# Patient Record
Sex: MALE | Race: WHITE | NOT HISPANIC OR LATINO | Employment: OTHER | URBAN - NONMETROPOLITAN AREA
[De-identification: names, ages, dates, MRNs, and addresses within clinical notes are randomized per-mention and may not be internally consistent; named-entity substitution may affect disease eponyms.]

---

## 2017-01-18 ENCOUNTER — HOSPITAL ENCOUNTER (OUTPATIENT)
Dept: GENERAL RADIOLOGY | Facility: HOSPITAL | Age: 27
Discharge: HOME | End: 2017-01-18
Attending: NURSE PRACTITIONER

## 2017-04-15 ENCOUNTER — APPOINTMENT (OUTPATIENT)
Dept: GENERAL RADIOLOGY | Facility: CLINIC | Age: 27
End: 2017-04-15

## 2017-04-15 PROCEDURE — 74022 RADEX COMPL AQT ABD SERIES: CPT | Performed by: PHYSICIAN ASSISTANT

## 2018-09-08 ENCOUNTER — TELEPHONE (OUTPATIENT)
Dept: URGENT CARE | Facility: CLINIC | Age: 28
End: 2018-09-08

## 2018-09-08 DIAGNOSIS — N45.1 ACUTE EPIDIDYMITIS: ICD-10-CM

## 2018-09-08 DIAGNOSIS — N50.819 PERSISTENT TESTICULAR PAIN: ICD-10-CM

## 2018-09-08 DIAGNOSIS — R30.0 DYSURIA: ICD-10-CM

## 2018-09-08 RX ORDER — SULFAMETHOXAZOLE AND TRIMETHOPRIM 800; 160 MG/1; MG/1
TABLET ORAL
Qty: 28 TABLET | Refills: 0 | Status: SHIPPED | OUTPATIENT
Start: 2018-09-08 | End: 2020-07-15

## 2018-09-10 ENCOUNTER — TELEPHONE (OUTPATIENT)
Dept: URGENT CARE | Facility: CLINIC | Age: 28
End: 2018-09-10

## 2018-09-25 ENCOUNTER — TRANSCRIBE ORDERS (OUTPATIENT)
Dept: ADMINISTRATIVE | Facility: HOSPITAL | Age: 28
End: 2018-09-25

## 2018-09-25 DIAGNOSIS — N50.811 TESTICULAR PAIN, RIGHT: Primary | ICD-10-CM

## 2018-09-27 ENCOUNTER — HOSPITAL ENCOUNTER (OUTPATIENT)
Dept: ULTRASOUND IMAGING | Facility: HOSPITAL | Age: 28
Discharge: HOME | End: 2018-09-27
Admitting: INTERNAL MEDICINE

## 2018-09-27 DIAGNOSIS — N50.811 TESTICULAR PAIN, RIGHT: ICD-10-CM

## 2018-09-27 PROCEDURE — 76870 US EXAM SCROTUM: CPT

## 2020-07-15 ENCOUNTER — OFFICE VISIT (OUTPATIENT)
Dept: NEUROLOGY | Facility: CLINIC | Age: 30
End: 2020-07-15

## 2020-07-15 VITALS
TEMPERATURE: 97.1 F | HEIGHT: 68 IN | BODY MASS INDEX: 30.31 KG/M2 | DIASTOLIC BLOOD PRESSURE: 70 MMHG | HEART RATE: 112 BPM | SYSTOLIC BLOOD PRESSURE: 118 MMHG | WEIGHT: 200 LBS | OXYGEN SATURATION: 98 %

## 2020-07-15 DIAGNOSIS — G43.109 MIGRAINE WITH AURA AND WITHOUT STATUS MIGRAINOSUS, NOT INTRACTABLE: Primary | ICD-10-CM

## 2020-07-15 PROCEDURE — 99204 OFFICE O/P NEW MOD 45 MIN: CPT | Performed by: NURSE PRACTITIONER

## 2020-07-15 RX ORDER — VITAMIN B COMPLEX
1 CAPSULE ORAL DAILY
Qty: 30 CAPSULE | Refills: 11 | Status: ON HOLD | OUTPATIENT
Start: 2020-07-15 | End: 2021-07-09

## 2020-07-15 RX ORDER — SUMATRIPTAN 50 MG/1
TABLET, FILM COATED ORAL
COMMUNITY
Start: 2020-07-06 | End: 2021-07-08

## 2020-07-15 RX ORDER — MAGNESIUM OXIDE 400 MG/1
400 TABLET ORAL EVERY EVENING
Qty: 30 TABLET | Refills: 3 | Status: ON HOLD | OUTPATIENT
Start: 2020-07-15 | End: 2021-07-09

## 2020-07-15 RX ORDER — VENLAFAXINE HYDROCHLORIDE 75 MG/1
CAPSULE, EXTENDED RELEASE ORAL
Status: ON HOLD | COMMUNITY
Start: 2020-06-30 | End: 2021-07-09 | Stop reason: DRUGHIGH

## 2020-07-15 NOTE — PROGRESS NOTES
"     New Neurology Patient Office Visit      Patient Name: Rafael Goncalves    Referring Physician: Jacky Hair MD    Chief Complaint:    Chief Complaint   Patient presents with   • Consult     NP, he was referred to this office by Dr. Hair at [facility] for right arm numbness and headaches. Patient states he has been asymptomactic since episode in March 2020.       History of Present Illness: Rafael Goncalves is a 30 y.o. male who is here today to establish care with Neurology.  In March, he experienced what he states \"felt like a mini stroke\".  He reports that he felt \"dyslexic\", and had right hand numbness.  This was followed by headache.  He is familiar with FAST acronym, and states that he was able to move all his extremities and speak, although he felt like he was having trouble understanding.  He had difficulty using hand controller for video game. He states that he has a history of migraine headaches, but never experienced aura.  He describes his migraine as most severe during school years.  He previously took amitriptyline which was helpful for controlling headaches, but he no longer requires this.  He estimates his headache frequency at 1 to 2/month, and he has had no more episodes of migraine with aura since March.  He has significant anxiety, but no other health problems.    The following portions of the patient's history were reviewed and updated as appropriate: allergies, current medications, past family history, past medical history, past social history, past surgical history and problem list.    Subjective      Review of Systems:   Review of Systems   Constitutional: Negative for activity change and fatigue.   HENT: Negative for drooling and voice change.    Eyes: Negative for blurred vision, double vision, photophobia and visual disturbance.   Respiratory: Negative for shortness of breath.    Cardiovascular: Negative for chest pain and palpitations.   Gastrointestinal: Negative " for nausea and vomiting.   Genitourinary: Negative for urinary incontinence.   Musculoskeletal: Negative for arthralgias, back pain, gait problem, myalgias and neck pain.   Allergic/Immunologic: Negative for immunocompromised state.   Neurological: Positive for numbness and headache. Negative for dizziness, tremors, seizures, syncope, facial asymmetry, speech difficulty, weakness, light-headedness, memory problem and confusion.   Hematological: Does not bruise/bleed easily.   Psychiatric/Behavioral: Negative for decreased concentration, dysphoric mood, hallucinations, sleep disturbance, depressed mood and stress. The patient is not nervous/anxious.        Past Medical History:   Past Medical History:   Diagnosis Date   • Anxiety and depression    • Hypertension    • Migraine    • Tachycardia        Past Surgical History: History reviewed. No pertinent surgical history.    Family History:   Family History   Problem Relation Age of Onset   • Hyperlipidemia Father    • Diabetes Father    • Diabetes Maternal Grandfather    • Diabetes Paternal Grandfather    • Autoimmune disease Mother        Social History:   Social History     Socioeconomic History   • Marital status: Single     Spouse name: Not on file   • Number of children: Not on file   • Years of education: Not on file   • Highest education level: Not on file   Tobacco Use   • Smoking status: Former Smoker     Packs/day: 0.50   • Smokeless tobacco: Current User   Substance and Sexual Activity   • Alcohol use: No   • Drug use: Never   • Sexual activity: Defer       Medications:     Current Outpatient Medications:   •  ALPRAZolam (XANAX) 1 MG tablet, Take 1 mg by mouth 3 (Three) Times a Day As Needed for Anxiety., Disp: , Rfl:   •  metoprolol succinate XL (TOPROL-XL) 25 MG 24 hr tablet, Take 50 mg by mouth 2 (two) times a day., Disp: , Rfl:   •  QUEtiapine XR (SEROquel XR) 300 MG 24 hr tablet, Take 300 mg by mouth 2 (two) times a day., Disp: , Rfl: 0  •  SUMAtriptan  "(IMITREX) 50 MG tablet, , Disp: , Rfl:   •  b complex vitamins capsule, Take 1 capsule by mouth Daily., Disp: 30 capsule, Rfl: 11  •  magnesium oxide (MAG-OX) 400 MG tablet, Take 1 tablet by mouth Every Evening., Disp: 30 tablet, Rfl: 3  •  venlafaxine XR (EFFEXOR-XR) 75 MG 24 hr capsule, TAKE 3 CAPSULES BY MOUTH EVERY DAY WITH FOOD, Disp: , Rfl:     Allergies:   No Known Allergies    Objective     Physical Exam:  Vital Signs:   Vitals:    07/15/20 0907   BP: 118/70   BP Location: Left arm   Patient Position: Sitting   Cuff Size: Adult   Pulse: 112   Temp: 97.1 °F (36.2 °C)   SpO2: 98%   Weight: 90.7 kg (200 lb)   Height: 172.7 cm (68\")   PainSc: 0-No pain       Physical Exam   Constitutional: He is oriented to person, place, and time. He appears well-developed.   HENT:   Head: Normocephalic.   Eyes: Pupils are equal, round, and reactive to light. EOM are normal.   Cardiovascular: Regular rhythm and normal heart sounds.   Pulmonary/Chest: Effort normal and breath sounds normal.   Neurological: He is oriented to person, place, and time. He has normal strength. He has a normal Romberg Test and a normal Tandem Gait Test. Gait normal.   Skin: Skin is warm. Capillary refill takes less than 2 seconds.   Psychiatric: He has a normal mood and affect. His speech is normal and behavior is normal. Judgment and thought content normal.   Nursing note and vitals reviewed.      Neurologic Exam     Mental Status   Oriented to person, place, and time.   Attention: normal. Concentration: normal.   Speech: speech is normal   Level of consciousness: alert  Knowledge: good.   Normal comprehension.     Cranial Nerves     CN II   Visual fields full to confrontation.   Visual acuity: normal    CN III, IV, VI   Pupils are equal, round, and reactive to light.  Extraocular motions are normal.   Right pupil: Shape: regular. Reactivity: brisk.   Left pupil: Shape: regular. Reactivity: brisk.   Diplopia: none  Ophthalmoparesis: none  Upgaze: " normal  Downgaze: normal    CN V   Facial sensation intact.     CN VII   Facial expression full, symmetric.     CN VIII   CN VIII normal.     CN IX, X   CN IX normal.   CN X normal.     CN XI   CN XI normal.     CN XII   CN XII normal.     Motor Exam   Muscle bulk: normal  Overall muscle tone: normal  Right arm pronator drift: absent  Left arm pronator drift: absent    Strength   Strength 5/5 throughout.     Sensory Exam   Light touch normal.   Vibration normal.     Gait, Coordination, and Reflexes     Gait  Gait: normal    Coordination   Romberg: negative  Tandem walking coordination: normal    Tremor   Resting tremor: absent  Intention tremor: absent  Action tremor: left arm and right arm (noted on pronator drift testing)    Reflexes   Reflexes 2+ except as noted.        Results Review:   I have reviewed the patient's other medical records to include, labs, radiology and referrals.     Assessment / Plan      Assessment/Plan:   Rafael was seen today for consult.    Diagnoses and all orders for this visit:    Migraine with aura and without status migrainosus, not intractable  -     b complex vitamins capsule; Take 1 capsule by mouth Daily.  -     magnesium oxide (MAG-OX) 400 MG tablet; Take 1 tablet by mouth Every Evening.  -     MRI Brain Without Contrast; Future    Patient has episodic migraine with aura, currently well controlled.  He has been prescribed Imitrex by his primary care doctor, we reviewed dosing and instructions for use today.  He declined need for daily preventative medication at this time.  We discussed use of B complex vitamin and magnesium supplements, patient agreed to try this for headache prevention.  Given change in migraine symptoms and concern for possible TIA, patient has been ordered for MRI of the brain to assess for vascular or structural abnormalities.  He was encouraged to contact provider if any change in symptoms or worsening of headache frequency.    Follow Up:   Return if symptoms  worsen or fail to improve.       Kanika Matos, TANYA  [facility], [place]       Please note that portions of this note may have been completed with a voice recognition program. Efforts were made to edit the dictations, but occasionally words are mistranscribed.

## 2020-07-15 NOTE — PATIENT INSTRUCTIONS
Migraine Headache  A migraine headache is an intense, throbbing pain on one side or both sides of the head. Migraine headaches may also cause other symptoms, such as nausea, vomiting, and sensitivity to light and noise. A migraine headache can last from 4 hours to 3 days. Talk with your doctor about what things may bring on (trigger) your migraine headaches.  What are the causes?  The exact cause of this condition is not known. However, a migraine may be caused when nerves in the brain become irritated and release chemicals that cause inflammation of blood vessels. This inflammation causes pain. This condition may be triggered or caused by:  · Drinking alcohol.  · Smoking.  · Taking medicines, such as:  ? Medicine used to treat chest pain (nitroglycerin).  ? Birth control pills.  ? Estrogen.  ? Certain blood pressure medicines.  · Eating or drinking products that contain nitrates, glutamate, aspartame, or tyramine. Aged cheeses, chocolate, or caffeine may also be triggers.  · Doing physical activity.  Other things that may trigger a migraine headache include:  · Menstruation.  · Pregnancy.  · Hunger.  · Stress.  · Lack of sleep or too much sleep.  · Weather changes.  · Fatigue.  What increases the risk?  The following factors may make you more likely to experience migraine headaches:  · Being a certain age. This condition is more common in people who are 25-55 years old.  · Being female.  · Having a family history of migraine headaches.  · Being .  · Having a mental health condition, such as depression or anxiety.  · Being obese.  What are the signs or symptoms?  The main symptom of this condition is pulsating or throbbing pain. This pain may:  · Happen in any area of the head, such as on one side or both sides.  · Interfere with daily activities.  · Get worse with physical activity.  · Get worse with exposure to bright lights or loud noises.  Other symptoms may  include:  · Nausea.  · Vomiting.  · Dizziness.  · General sensitivity to bright lights, loud noises, or smells.  Before you get a migraine headache, you may get warning signs (an aura). An aura may include:  · Seeing flashing lights or having blind spots.  · Seeing bright spots, halos, or zigzag lines.  · Having tunnel vision or blurred vision.  · Having numbness or a tingling feeling.  · Having trouble talking.  · Having muscle weakness.  Some people have symptoms after a migraine headache (postdromal phase), such as:  · Feeling tired.  · Difficulty concentrating.  How is this diagnosed?  A migraine headache can be diagnosed based on:  · Your symptoms.  · A physical exam.  · Tests, such as:  ? CT scan or an MRI of the head. These imaging tests can help rule out other causes of headaches.  ? Taking fluid from the spine (lumbar puncture) and analyzing it (cerebrospinal fluid analysis, or CSF analysis).  How is this treated?  This condition may be treated with medicines that:  · Relieve pain.  · Relieve nausea.  · Prevent migraine headaches.  Treatment for this condition may also include:  · Acupuncture.  · Lifestyle changes like avoiding foods that trigger migraine headaches.  · Biofeedback.  · Cognitive behavioral therapy.  Follow these instructions at home:  Medicines  · Take over-the-counter and prescription medicines only as told by your health care provider.  · Ask your health care provider if the medicine prescribed to you:  ? Requires you to avoid driving or using heavy machinery.  ? Can cause constipation. You may need to take these actions to prevent or treat constipation:  § Drink enough fluid to keep your urine pale yellow.  § Take over-the-counter or prescription medicines.  § Eat foods that are high in fiber, such as beans, whole grains, and fresh fruits and vegetables.  § Limit foods that are high in fat and processed sugars, such as fried or sweet foods.  Lifestyle  · Do not drink alcohol.  · Do not  use any products that contain nicotine or tobacco, such as cigarettes, e-cigarettes, and chewing tobacco. If you need help quitting, ask your health care provider.  · Get at least 8 hours of sleep every night.  · Find ways to manage stress, such as meditation, deep breathing, or yoga.  General instructions         · Keep a journal to find out what may trigger your migraine headaches. For example, write down:  ? What you eat and drink.  ? How much sleep you get.  ? Any change to your diet or medicines.  · If you have a migraine headache:  ? Avoid things that make your symptoms worse, such as bright lights.  ? It may help to lie down in a dark, quiet room.  ? Do not drive or use heavy machinery.  ? Ask your health care provider what activities are safe for you while you are experiencing symptoms.  · Keep all follow-up visits as told by your health care provider. This is important.  Contact a health care provider if:  · You develop symptoms that are different or more severe than your usual migraine headache symptoms.  · You have more than 15 headache days in one month.  Get help right away if:  · Your migraine headache becomes severe.  · Your migraine headache lasts longer than 72 hours.  · You have a fever.  · You have a stiff neck.  · You have vision loss.  · Your muscles feel weak or like you cannot control them.  · You start to lose your balance often.  · You have trouble walking.  · You faint.  · You have a seizure.  Summary  · A migraine headache is an intense, throbbing pain on one side or both sides of the head. Migraines may also cause other symptoms, such as nausea, vomiting, and sensitivity to light and noise.  · This condition may be treated with medicines and lifestyle changes. You may also need to avoid certain things that trigger a migraine headache.  · Keep a journal to find out what may trigger your migraine headaches.  · Contact your health care provider if you have more than 15 headache days in a  month or you develop symptoms that are different or more severe than your usual migraine headache symptoms.  This information is not intended to replace advice given to you by your health care provider. Make sure you discuss any questions you have with your health care provider.  Document Released: 12/18/2006 Document Revised: 04/10/2020 Document Reviewed: 01/30/2020  Elsevier Patient Education © 2020 [employer]

## 2020-07-29 ENCOUNTER — HOSPITAL ENCOUNTER (OUTPATIENT)
Dept: MRI IMAGING | Facility: HOSPITAL | Age: 30
End: 2020-07-29

## 2020-08-11 ENCOUNTER — HOSPITAL ENCOUNTER (OUTPATIENT)
Dept: MRI IMAGING | Facility: HOSPITAL | Age: 30
Discharge: HOME | End: 2020-08-11
Admitting: NURSE PRACTITIONER

## 2020-08-11 DIAGNOSIS — G43.109 MIGRAINE WITH AURA AND WITHOUT STATUS MIGRAINOSUS, NOT INTRACTABLE: ICD-10-CM

## 2020-08-11 PROCEDURE — 70551 MRI BRAIN STEM W/O DYE: CPT

## 2020-08-12 ENCOUNTER — TELEPHONE (OUTPATIENT)
Dept: NEUROLOGY | Facility: CLINIC | Age: 30
End: 2020-08-12

## 2020-08-12 NOTE — TELEPHONE ENCOUNTER
Pt calling wanting results of the MRI of brain that was done on 8-11-20. Please call him back at [phone]

## 2021-07-08 ENCOUNTER — HOSPITAL ENCOUNTER (EMERGENCY)
Facility: HOSPITAL | Age: 31
Discharge: PSYCHIATRIC OR SUBSTANCE USE FACILITY | End: 2021-07-08
Attending: EMERGENCY MEDICINE | Admitting: EMERGENCY MEDICINE

## 2021-07-08 ENCOUNTER — HOSPITAL ENCOUNTER (INPATIENT)
Facility: HOSPITAL | Age: 31
LOS: 5 days | Discharge: HOME | End: 2021-07-13
Attending: PSYCHIATRY & NEUROLOGY | Admitting: PSYCHIATRY & NEUROLOGY

## 2021-07-08 VITALS
WEIGHT: 208 LBS | HEIGHT: 68 IN | HEART RATE: 107 BPM | SYSTOLIC BLOOD PRESSURE: 144 MMHG | DIASTOLIC BLOOD PRESSURE: 99 MMHG | RESPIRATION RATE: 20 BRPM | OXYGEN SATURATION: 98 % | TEMPERATURE: 98.3 F | BODY MASS INDEX: 31.52 KG/M2

## 2021-07-08 DIAGNOSIS — F13.930 BENZODIAZEPINE WITHDRAWAL WITHOUT COMPLICATION (HCC): ICD-10-CM

## 2021-07-08 DIAGNOSIS — F13.10 BENZODIAZEPINE ABUSE (HCC): Primary | ICD-10-CM

## 2021-07-08 LAB
ALBUMIN SERPL-MCNC: 4.5 G/DL (ref 3.5–5.2)
ALBUMIN/GLOB SERPL: 1.5 G/DL
ALP SERPL-CCNC: 58 U/L (ref 39–117)
ALT SERPL W P-5'-P-CCNC: 26 U/L (ref 1–41)
AMPHET+METHAMPHET UR QL: NEGATIVE
AMPHETAMINES UR QL: NEGATIVE
ANION GAP SERPL CALCULATED.3IONS-SCNC: 11.1 MMOL/L (ref 5–15)
APAP SERPL-MCNC: <5 MCG/ML (ref 0–30)
AST SERPL-CCNC: 18 U/L (ref 1–40)
BARBITURATES UR QL SCN: NEGATIVE
BASOPHILS # BLD AUTO: 0.04 10*3/MM3 (ref 0–0.2)
BASOPHILS NFR BLD AUTO: 0.5 % (ref 0–1.5)
BENZODIAZ UR QL SCN: POSITIVE
BILIRUB SERPL-MCNC: 0.4 MG/DL (ref 0–1.2)
BUN SERPL-MCNC: 10 MG/DL (ref 6–20)
BUN/CREAT SERPL: 11.2 (ref 7–25)
BUPRENORPHINE SERPL-MCNC: NEGATIVE NG/ML
CALCIUM SPEC-SCNC: 8.8 MG/DL (ref 8.6–10.5)
CANNABINOIDS SERPL QL: POSITIVE
CHLORIDE SERPL-SCNC: 100 MMOL/L (ref 98–107)
CO2 SERPL-SCNC: 25.9 MMOL/L (ref 22–29)
COCAINE UR QL: NEGATIVE
CREAT SERPL-MCNC: 0.89 MG/DL (ref 0.76–1.27)
DEPRECATED RDW RBC AUTO: 38.2 FL (ref 37–54)
EOSINOPHIL # BLD AUTO: 0.08 10*3/MM3 (ref 0–0.4)
EOSINOPHIL NFR BLD AUTO: 1 % (ref 0.3–6.2)
ERYTHROCYTE [DISTWIDTH] IN BLOOD BY AUTOMATED COUNT: 12.3 % (ref 12.3–15.4)
ETHANOL BLD-MCNC: <10 MG/DL (ref 0–10)
ETHANOL UR QL: <0.01 %
GFR SERPL CREATININE-BSD FRML MDRD: 100 ML/MIN/1.73
GLOBULIN UR ELPH-MCNC: 3 GM/DL
GLUCOSE SERPL-MCNC: 98 MG/DL (ref 65–99)
HCT VFR BLD AUTO: 51.3 % (ref 37.5–51)
HGB BLD-MCNC: 17.6 G/DL (ref 13–17.7)
HOLD SPECIMEN: NORMAL
IMM GRANULOCYTES # BLD AUTO: 0.03 10*3/MM3 (ref 0–0.05)
IMM GRANULOCYTES NFR BLD AUTO: 0.4 % (ref 0–0.5)
LYMPHOCYTES # BLD AUTO: 2.04 10*3/MM3 (ref 0.7–3.1)
LYMPHOCYTES NFR BLD AUTO: 25.8 % (ref 19.6–45.3)
MCH RBC QN AUTO: 29.3 PG (ref 26.6–33)
MCHC RBC AUTO-ENTMCNC: 34.3 G/DL (ref 31.5–35.7)
MCV RBC AUTO: 85.4 FL (ref 79–97)
METHADONE UR QL SCN: NEGATIVE
MONOCYTES # BLD AUTO: 0.55 10*3/MM3 (ref 0.1–0.9)
MONOCYTES NFR BLD AUTO: 6.9 % (ref 5–12)
NEUTROPHILS NFR BLD AUTO: 5.18 10*3/MM3 (ref 1.7–7)
NEUTROPHILS NFR BLD AUTO: 65.4 % (ref 42.7–76)
NRBC BLD AUTO-RTO: 0 /100 WBC (ref 0–0.2)
OPIATES UR QL: NEGATIVE
OXYCODONE UR QL SCN: NEGATIVE
PCP UR QL SCN: NEGATIVE
PLATELET # BLD AUTO: 268 10*3/MM3 (ref 140–450)
PMV BLD AUTO: 9.3 FL (ref 6–12)
POTASSIUM SERPL-SCNC: 4 MMOL/L (ref 3.5–5.2)
PROPOXYPH UR QL: NEGATIVE
PROT SERPL-MCNC: 7.5 G/DL (ref 6–8.5)
RBC # BLD AUTO: 6.01 10*6/MM3 (ref 4.14–5.8)
SALICYLATES SERPL-MCNC: <0.3 MG/DL
SODIUM SERPL-SCNC: 137 MMOL/L (ref 136–145)
TRICYCLICS UR QL SCN: POSITIVE
WBC # BLD AUTO: 7.92 10*3/MM3 (ref 3.4–10.8)
WHOLE BLOOD HOLD SPECIMEN: NORMAL

## 2021-07-08 PROCEDURE — 82077 ASSAY SPEC XCP UR&BREATH IA: CPT | Performed by: EMERGENCY MEDICINE

## 2021-07-08 PROCEDURE — 87636 SARSCOV2 & INF A&B AMP PRB: CPT | Performed by: PSYCHIATRY & NEUROLOGY

## 2021-07-08 PROCEDURE — 80053 COMPREHEN METABOLIC PANEL: CPT | Performed by: EMERGENCY MEDICINE

## 2021-07-08 PROCEDURE — 80143 DRUG ASSAY ACETAMINOPHEN: CPT | Performed by: EMERGENCY MEDICINE

## 2021-07-08 PROCEDURE — 80306 DRUG TEST PRSMV INSTRMNT: CPT | Performed by: EMERGENCY MEDICINE

## 2021-07-08 PROCEDURE — 99284 EMERGENCY DEPT VISIT MOD MDM: CPT

## 2021-07-08 PROCEDURE — 80179 DRUG ASSAY SALICYLATE: CPT | Performed by: EMERGENCY MEDICINE

## 2021-07-08 PROCEDURE — 85025 COMPLETE CBC W/AUTO DIFF WBC: CPT | Performed by: EMERGENCY MEDICINE

## 2021-07-08 PROCEDURE — HZ2ZZZZ DETOXIFICATION SERVICES FOR SUBSTANCE ABUSE TREATMENT: ICD-10-PCS | Performed by: PSYCHIATRY & NEUROLOGY

## 2021-07-08 RX ORDER — LORAZEPAM 0.5 MG/1
2 TABLET ORAL ONCE
Status: COMPLETED | OUTPATIENT
Start: 2021-07-08 | End: 2021-07-08

## 2021-07-08 RX ADMIN — LORAZEPAM 2 MG: 0.5 TABLET ORAL at 18:48

## 2021-07-08 NOTE — ED PROVIDER NOTES
Subjective   31-year-old male who presents to the emergency department chief complaint agitation, anxiety.  Patient reports that he has been taking Xanax for several years prescribed by his primary care physician.  Patient states that his primary care physician decided to stop giving him Xanax.  Patient states he was on 6 mg daily.  Denies any suicidal or homicidal ideation.  Denies any other associated drugs or alcohol abuse.      History provided by:  Patient   used: No    Drug / Alcohol Assessment  Primary symptoms include agitation.  Primary symptoms include no confusion, no somnolence, no seizures, no delusions, no hallucinations, no self-injury, no violence, and no intoxication. This is a new problem. The current episode started yesterday. The problem has not changed since onset.Suspected agents include prescription drugs. Pertinent negatives include no fever, no injury, no nausea, no vomiting and no bladder incontinence. Associated medical issues include addiction treatment. Associated medical issues do not include chronic illness, mental illness, psychiatric history or recent illness.       Review of Systems   Constitutional: Negative.  Negative for fever.   HENT: Negative.    Eyes: Negative.  Negative for photophobia.   Respiratory: Negative.  Negative for apnea, choking, chest tightness, shortness of breath and stridor.    Cardiovascular: Negative.  Negative for chest pain, palpitations and leg swelling.   Gastrointestinal: Negative.  Negative for nausea and vomiting.   Endocrine: Negative.  Negative for heat intolerance and polyphagia.   Genitourinary: Negative.  Negative for bladder incontinence, discharge, dysuria, enuresis, flank pain, frequency, genital sores and hematuria.   Musculoskeletal: Negative.  Negative for arthralgias, back pain, gait problem, joint swelling and myalgias.   Skin: Negative.  Negative for color change, pallor and wound.   Neurological: Negative.  Negative  for dizziness, seizures, facial asymmetry, light-headedness, numbness and headaches.   Psychiatric/Behavioral: Positive for agitation and behavioral problems. Negative for confusion, decreased concentration, dysphoric mood, hallucinations and self-injury. The patient is not nervous/anxious.    All other systems reviewed and are negative.      Past Medical History:   Diagnosis Date   • Anxiety    • Anxiety and depression    • Hypertension    • Migraine    • Tachycardia        No Known Allergies    Past Surgical History:   Procedure Laterality Date   • MOUTH SURGERY         Family History   Problem Relation Age of Onset   • Hyperlipidemia Father    • Diabetes Father    • Diabetes Maternal Grandfather    • Diabetes Paternal Grandfather    • Autoimmune disease Mother        Social History     Socioeconomic History   • Marital status: Single     Spouse name: Not on file   • Number of children: Not on file   • Years of education: Not on file   • Highest education level: Not on file   Tobacco Use   • Smoking status: Former Smoker     Packs/day: 0.50   • Smokeless tobacco: Former User   Vaping Use   • Vaping Use: Every day   • Substances: Nicotine, Flavoring   • Devices: Refillable tank   Substance and Sexual Activity   • Alcohol use: No   • Drug use: Never   • Sexual activity: Defer           Objective   Physical Exam  Vitals and nursing note reviewed.   Constitutional:       General: He is not in acute distress.     Appearance: Normal appearance. He is normal weight. He is not ill-appearing, toxic-appearing or diaphoretic.   HENT:      Head: Normocephalic and atraumatic.      Right Ear: Tympanic membrane, ear canal and external ear normal. There is no impacted cerumen.      Left Ear: Tympanic membrane, ear canal and external ear normal. There is no impacted cerumen.      Nose: Nose normal. No congestion or rhinorrhea.      Mouth/Throat:      Mouth: Mucous membranes are moist.      Pharynx: Oropharynx is clear. No  oropharyngeal exudate or posterior oropharyngeal erythema.   Eyes:      General: No scleral icterus.        Right eye: No discharge.         Left eye: No discharge.      Extraocular Movements: Extraocular movements intact.      Conjunctiva/sclera: Conjunctivae normal.      Pupils: Pupils are equal, round, and reactive to light.   Neck:      Vascular: No carotid bruit.   Cardiovascular:      Rate and Rhythm: Normal rate and regular rhythm.      Pulses: Normal pulses.      Heart sounds: Normal heart sounds. No murmur heard.   No friction rub. No gallop.    Pulmonary:      Effort: Pulmonary effort is normal. No respiratory distress.      Breath sounds: Normal breath sounds. No stridor. No wheezing, rhonchi or rales.   Chest:      Chest wall: No tenderness.   Abdominal:      General: Abdomen is flat. Bowel sounds are normal. There is no distension.      Palpations: Abdomen is soft. There is no mass.      Tenderness: There is no abdominal tenderness. There is no right CVA tenderness, left CVA tenderness, guarding or rebound.      Hernia: No hernia is present.   Musculoskeletal:         General: No swelling, tenderness, deformity or signs of injury. Normal range of motion.      Cervical back: Normal range of motion and neck supple. No rigidity or tenderness.      Right lower leg: No edema.      Left lower leg: No edema.   Lymphadenopathy:      Cervical: No cervical adenopathy.   Skin:     General: Skin is warm and dry.      Capillary Refill: Capillary refill takes less than 2 seconds.      Coloration: Skin is not jaundiced or pale.      Findings: No bruising, erythema, lesion or rash.   Neurological:      General: No focal deficit present.      Mental Status: He is alert and oriented to person, place, and time. Mental status is at baseline.      Cranial Nerves: No cranial nerve deficit.      Sensory: No sensory deficit.      Motor: No weakness.      Coordination: Coordination normal.      Gait: Gait normal.      Deep  Tendon Reflexes: Reflexes normal.   Psychiatric:         Mood and Affect: Mood normal.         Behavior: Behavior normal.         Thought Content: Thought content normal.         Judgment: Judgment normal.         Procedures       None      ED Course  ED Course as of Jul 08 2157   Th Jul 08, 2021 1905 Patient medically cleared for intake.     [BH]   2147 Endorsed to Dr. Frye    [BH]   2156 THC Screen, Urine(!): Positive [PF]   2156 Benzodiazepine Screen, Urine(!): Positive [PF]   2156 Tricyclic Antidepressants Screen(!): Positive [PF]   2156 Ethanol: <10 [PF]   2156 Acetaminophen: <5.0 [PF]   2156 Salicylate: <0.3 [PF]   2156 Glucose: 98 [PF]   2156 BUN: 10 [PF]   2156 Creatinine: 0.89 [PF]   2156 Sodium: 137 [PF]   2156 Potassium: 4.0 [PF]   2156 Chloride: 100 [PF]   2156 CO2: 25.9 [PF]   2156 Calcium: 8.8 [PF]   2156 Total Protein: 7.5 [PF]   2156 Albumin: 4.50 [PF]   2156 ALT (SGPT): 26 [PF]   2156 AST (SGOT): 18 [PF]   2156 Alkaline Phosphatase: 58 [PF]   2156 Total Bilirubin: 0.4 [PF]   2156 WBC: 7.92 [PF]   2156 Hemoglobin: 17.6 [PF]   2156 Hematocrit(!): 51.3 [PF]   2156 Platelets: 268 [PF]      ED Course User Index  [BH] Brent Chiang PA-C  [PF] Jacky Frye,                                            [facility]  21:57 EDT  I received care from physicians assistant.  Patient be accepted to [facility] on behalf of Dr. Tello.  Patient is medically clear for psychiatric evaluation.  Final diagnoses:   Benzodiazepine abuse (CMS/HCC)   Benzodiazepine withdrawal without complication (CMS/HCC)       ED Disposition  ED Disposition     ED Disposition Condition Comment    Transfer to Another Facility             No follow-up provider specified.       Medication List      No changes were made to your prescriptions during this visit.          Jacky Frye,   07/08/21 2157

## 2021-07-08 NOTE — ED NOTES
Lester, with behavioral health, called at this time. She stated she will be here in an hour.     Anuja Webb  07/08/21 5561

## 2021-07-09 PROBLEM — F19.10 SUBSTANCE ABUSE: Status: ACTIVE | Noted: 2021-07-09

## 2021-07-09 PROBLEM — F33.1 MDD (MAJOR DEPRESSIVE DISORDER), RECURRENT EPISODE, MODERATE (HCC): Status: ACTIVE | Noted: 2021-07-09

## 2021-07-09 PROBLEM — F41.1 GAD (GENERALIZED ANXIETY DISORDER): Status: ACTIVE | Noted: 2021-07-09

## 2021-07-09 PROBLEM — F13.20 SEVERE BENZODIAZEPINE USE DISORDER (HCC): Status: ACTIVE | Noted: 2021-07-09

## 2021-07-09 LAB
FLUAV RNA RESP QL NAA+PROBE: NOT DETECTED
FLUBV RNA RESP QL NAA+PROBE: NOT DETECTED
SARS-COV-2 RNA RESP QL NAA+PROBE: NOT DETECTED

## 2021-07-09 PROCEDURE — 63710000001 ONDANSETRON PER 8 MG: Performed by: PSYCHIATRY & NEUROLOGY

## 2021-07-09 PROCEDURE — 99223 1ST HOSP IP/OBS HIGH 75: CPT | Performed by: PSYCHIATRY & NEUROLOGY

## 2021-07-09 RX ORDER — ECHINACEA PURPUREA EXTRACT 125 MG
2 TABLET ORAL AS NEEDED
Status: DISCONTINUED | OUTPATIENT
Start: 2021-07-09 | End: 2021-07-13 | Stop reason: HOSPADM

## 2021-07-09 RX ORDER — HYDROXYZINE 50 MG/1
50 TABLET, FILM COATED ORAL EVERY 6 HOURS PRN
Status: DISCONTINUED | OUTPATIENT
Start: 2021-07-09 | End: 2021-07-13 | Stop reason: HOSPADM

## 2021-07-09 RX ORDER — LORAZEPAM 2 MG/1
2 TABLET ORAL
Status: ACTIVE | OUTPATIENT
Start: 2021-07-09 | End: 2021-07-09

## 2021-07-09 RX ORDER — BENZTROPINE MESYLATE 1 MG/ML
1 INJECTION INTRAMUSCULAR; INTRAVENOUS ONCE AS NEEDED
Status: DISCONTINUED | OUTPATIENT
Start: 2021-07-09 | End: 2021-07-13 | Stop reason: HOSPADM

## 2021-07-09 RX ORDER — LORAZEPAM 1 MG/1
1 TABLET ORAL
Status: COMPLETED | OUTPATIENT
Start: 2021-07-11 | End: 2021-07-11

## 2021-07-09 RX ORDER — ONDANSETRON 4 MG/1
4 TABLET, FILM COATED ORAL EVERY 6 HOURS PRN
Status: DISCONTINUED | OUTPATIENT
Start: 2021-07-09 | End: 2021-07-13 | Stop reason: HOSPADM

## 2021-07-09 RX ORDER — ALPRAZOLAM 1 MG/1
1 TABLET ORAL 3 TIMES DAILY PRN
Status: CANCELLED | OUTPATIENT
Start: 2021-07-09

## 2021-07-09 RX ORDER — QUETIAPINE 300 MG/1
600 TABLET, FILM COATED, EXTENDED RELEASE ORAL NIGHTLY
Status: DISCONTINUED | OUTPATIENT
Start: 2021-07-09 | End: 2021-07-13 | Stop reason: HOSPADM

## 2021-07-09 RX ORDER — LORAZEPAM 2 MG/1
2 TABLET ORAL EVERY 4 HOURS PRN
Status: DISPENSED | OUTPATIENT
Start: 2021-07-09 | End: 2021-07-10

## 2021-07-09 RX ORDER — LORAZEPAM 2 MG/1
2 TABLET ORAL
Status: DISPENSED | OUTPATIENT
Start: 2021-07-09 | End: 2021-07-10

## 2021-07-09 RX ORDER — VENLAFAXINE HYDROCHLORIDE 75 MG/1
225 CAPSULE, EXTENDED RELEASE ORAL NIGHTLY
COMMUNITY
End: 2021-10-13 | Stop reason: SDUPTHER

## 2021-07-09 RX ORDER — ACETAMINOPHEN 325 MG/1
650 TABLET ORAL EVERY 6 HOURS PRN
Status: DISCONTINUED | OUTPATIENT
Start: 2021-07-09 | End: 2021-07-13 | Stop reason: HOSPADM

## 2021-07-09 RX ORDER — METOPROLOL SUCCINATE 50 MG/1
50 TABLET, EXTENDED RELEASE ORAL 2 TIMES DAILY
COMMUNITY
End: 2021-11-11

## 2021-07-09 RX ORDER — BENZTROPINE MESYLATE 1 MG/1
2 TABLET ORAL ONCE AS NEEDED
Status: DISCONTINUED | OUTPATIENT
Start: 2021-07-09 | End: 2021-07-13 | Stop reason: HOSPADM

## 2021-07-09 RX ORDER — TRAZODONE HYDROCHLORIDE 50 MG/1
50 TABLET ORAL NIGHTLY PRN
Status: DISCONTINUED | OUTPATIENT
Start: 2021-07-09 | End: 2021-07-13 | Stop reason: HOSPADM

## 2021-07-09 RX ORDER — BENZONATATE 100 MG/1
100 CAPSULE ORAL 3 TIMES DAILY PRN
Status: DISCONTINUED | OUTPATIENT
Start: 2021-07-09 | End: 2021-07-13 | Stop reason: HOSPADM

## 2021-07-09 RX ORDER — LORAZEPAM 0.5 MG/1
0.5 TABLET ORAL EVERY 4 HOURS PRN
Status: ACTIVE | OUTPATIENT
Start: 2021-07-12 | End: 2021-07-13

## 2021-07-09 RX ORDER — LORAZEPAM 1 MG/1
1 TABLET ORAL EVERY 4 HOURS PRN
Status: ACTIVE | OUTPATIENT
Start: 2021-07-11 | End: 2021-07-12

## 2021-07-09 RX ORDER — ALUMINA, MAGNESIA, AND SIMETHICONE 2400; 2400; 240 MG/30ML; MG/30ML; MG/30ML
15 SUSPENSION ORAL EVERY 6 HOURS PRN
Status: DISCONTINUED | OUTPATIENT
Start: 2021-07-09 | End: 2021-07-13 | Stop reason: HOSPADM

## 2021-07-09 RX ORDER — METOPROLOL SUCCINATE 50 MG/1
50 TABLET, EXTENDED RELEASE ORAL NIGHTLY
Status: DISCONTINUED | OUTPATIENT
Start: 2021-07-09 | End: 2021-07-13 | Stop reason: HOSPADM

## 2021-07-09 RX ORDER — FAMOTIDINE 20 MG/1
20 TABLET, FILM COATED ORAL 2 TIMES DAILY PRN
Status: DISCONTINUED | OUTPATIENT
Start: 2021-07-09 | End: 2021-07-13 | Stop reason: HOSPADM

## 2021-07-09 RX ORDER — LORAZEPAM 2 MG/1
2 TABLET ORAL
Status: COMPLETED | OUTPATIENT
Start: 2021-07-09 | End: 2021-07-09

## 2021-07-09 RX ORDER — LORAZEPAM 0.5 MG/1
0.5 TABLET ORAL
Status: COMPLETED | OUTPATIENT
Start: 2021-07-12 | End: 2021-07-12

## 2021-07-09 RX ORDER — LOPERAMIDE HYDROCHLORIDE 2 MG/1
2 CAPSULE ORAL
Status: DISCONTINUED | OUTPATIENT
Start: 2021-07-09 | End: 2021-07-13 | Stop reason: HOSPADM

## 2021-07-09 RX ORDER — IBUPROFEN 400 MG/1
400 TABLET ORAL EVERY 6 HOURS PRN
Status: DISCONTINUED | OUTPATIENT
Start: 2021-07-09 | End: 2021-07-13 | Stop reason: HOSPADM

## 2021-07-09 RX ORDER — NICOTINE 21 MG/24HR
1 PATCH, TRANSDERMAL 24 HOURS TRANSDERMAL
Status: DISCONTINUED | OUTPATIENT
Start: 2021-07-09 | End: 2021-07-13 | Stop reason: HOSPADM

## 2021-07-09 RX ADMIN — QUETIAPINE FUMARATE 600 MG: 300 TABLET, EXTENDED RELEASE ORAL at 02:35

## 2021-07-09 RX ADMIN — METOPROLOL SUCCINATE 50 MG: 50 TABLET, EXTENDED RELEASE ORAL at 02:35

## 2021-07-09 RX ADMIN — LORAZEPAM 2 MG: 2 TABLET ORAL at 21:19

## 2021-07-09 RX ADMIN — VENLAFAXINE HYDROCHLORIDE 225 MG: 150 CAPSULE, EXTENDED RELEASE ORAL at 02:35

## 2021-07-09 RX ADMIN — HYDROXYZINE HYDROCHLORIDE 50 MG: 50 TABLET ORAL at 17:00

## 2021-07-09 RX ADMIN — METOPROLOL SUCCINATE 50 MG: 50 TABLET, EXTENDED RELEASE ORAL at 20:08

## 2021-07-09 RX ADMIN — VENLAFAXINE HYDROCHLORIDE 225 MG: 150 CAPSULE, EXTENDED RELEASE ORAL at 20:08

## 2021-07-09 RX ADMIN — LORAZEPAM 2 MG: 2 TABLET ORAL at 17:00

## 2021-07-09 RX ADMIN — LORAZEPAM 2 MG: 2 TABLET ORAL at 09:03

## 2021-07-09 RX ADMIN — ONDANSETRON HYDROCHLORIDE 4 MG: 4 TABLET, FILM COATED ORAL at 17:00

## 2021-07-09 RX ADMIN — LORAZEPAM 2 MG: 2 TABLET ORAL at 14:59

## 2021-07-09 RX ADMIN — QUETIAPINE FUMARATE 600 MG: 300 TABLET, EXTENDED RELEASE ORAL at 20:08

## 2021-07-09 NOTE — PLAN OF CARE
Goal Outcome Evaluation:  Plan of Care Reviewed With: patient  Patient Agreement with Plan of Care: agrees  Consent Given to Review Plan with: [place]  Progress: no change  Outcome Summary: Completed social history. Reviewed treatment plans. Patient agreeable.      Problem: Adult Behavioral Health Plan of Care  Goal: Plan of Care Review  Outcome: Ongoing, Progressing  Flowsheets (Taken 7/9/2021 1137)  Consent Given to Review Plan with: [place]  Progress: no change  Plan of Care Reviewed With: patient  Patient Agreement with Plan of Care: agrees  Outcome Summary: Completed social history. Reviewed treatment plans. Patient agreeable.     Problem: Adult Behavioral Health Plan of Care  Goal: Patient-Specific Goal (Individualization)  Outcome: Ongoing, Progressing  Flowsheets  Taken 7/9/2021 1137  Patient Personal Strengths:   resilient   resourceful   stable living environment   socioeconomic stability   realistic evaluation of current/future capabilities   positive attitude   community support   family/social support   insight into illness/situation   intellectual cognitive skills   motivated for treatment  Patient-Specific Goals (Include Timeframe): Identify 1-2 cognitive distortions  Individualized Care Needs: CBT  Patient Vulnerabilities: (substance dependence) other (see comments)  Taken 7/9/2021 0919  Patient Personal Strengths: (I'm modest. humble.) other (see comments)  Patient Vulnerabilities: (smart ass.) other (see comments)     Problem: Adult Behavioral Health Plan of Care  Goal: Optimized Coping Skills in Response to Life Stressors  Intervention: Promote Effective Coping Strategies  Flowsheets (Taken 7/9/2021 1137)  Supportive Measures:   active listening utilized   positive reinforcement provided   self-responsibility promoted   verbalization of feelings encouraged   problem-solving facilitated   counseling provided   self-reflection promoted     Problem: Adult Behavioral Health Plan of Care  Goal:  Develops/Participates in Therapeutic [place] to Support Successful Transition  Intervention: Foster Therapeutic [place]  Flowsheets (Taken 7/9/2021 1136)  Trust Relationship/Rapport:   care explained   reassurance provided   thoughts/feelings acknowledged   choices provided   emotional support provided   empathic listening provided   questions answered   questions encouraged     Problem: Adult Behavioral Health Plan of Care  Goal: Develops/Participates in Therapeutic [place] to Support Successful Transition  Intervention: Mutually Develop Transition Plan  Flowsheets (Taken 7/9/2021 1131)  Outpatient/Agency/Support Group Needs:   outpatient counseling   outpatient psychiatric care (specify)  Discharge Coordination/Progress: [place]  Transition Support: follow-up care discussed  Transportation Anticipated: family or friend will provide  Anticipated Discharge Disposition: home with family  Transportation Concerns: car, none  Current Discharge Risk: psychiatric illness  Concerns to be Addressed:   mental health   discharge planning   coping/stress   medication  Readmission Within the Last 30 Days: no previous admission in last 30 days  Patient/Family Anticipated Services at Transition: mental health services  Patient's Choice of Community Agency(s): [facility] [place]  Patient/Family Anticipates Transition to: home with family  Offered/Gave Vendor List: no       [phone]  D: Patient is a 31-year-old  male currently residing in [facility] where he has been living in a house with his brother for the past 2 years.  The house is owned by his mother, and they pay her rent.  The patient has some college education.  He is currently unemployed, having last worked at the age of 22.  He now receives disability income.  The patient reported having been diagnosed with generalized anxiety disorder and PTSD.  He has been using benzodiazepines since his teenage years.  The patient reported having never misused these  medications.  However, he had been on a daily dose for some time now, and had decided it was time to begin cessation.  The patient has a therapist at [facility] in [place], and he planned to follow-up there for outpatient therapy and psychiatry.    A: Patient's affect appeared euthymic.  His mood appeared anxious.  He was polite and cooperative.  He seemed to have a good degree of insight.  He also seemed to have a good degree of motivation to change.  It seemed he had good sources of support as well.    P: Patient has been placed on a detox regimen.  He will be monitored routinely for his safety.  He will be provided with individual and group therapy.  He will continue to see his therapist at [facility] upon discharge.  He had an appointment scheduled for today but will need to reschedule this.  He will also begin seeing a psychiatrist at [facility].

## 2021-07-09 NOTE — CONSULTS
"Rafael Goncalves  1990    TIME: 1950 - 2020    Is patient agreeable to admission/treatment? Yes    Guardian: (Must have paperwork) FAMILY MEMBER - GUARDIAN: Self    Pt Lives With:  Lives with his younger brother but has been staying with Mom for last two weeks     Highest Level of Education: some college     Presenting Problems: (How is the patient a danger to self or others?) Pt presented to ED withdrawal from Xanax. He was taking 6mg daily along with his other medications. He started using Delta 8 THC which he thought was a legal product but when he tested positive at his doctor's visit they cut him off from his medication. Pt has some left but is worried about what will happen when he runs out. Pt has been wanting to detox from xanax and find other options which is why he tried the THC substance.     Current Stressors: chemical dependency/abuse, housing  concerns, illness, inadequate services and mental health condition    Depression: 8 States this is his \"normal\"     Anxiety: 6    Previous Psychiatric Treatment: Yes    If yes, describe: Pt was admitted to [facility] when he was 18 years old for depression, anxiety, and self harm.     Last inpatient admission: 18 years old     Number of admissions: 1    Last outpatient visit: Med Management = in July; Therapy = two weeks ago.     Suicidal: Absent    Previous Attempts: no prior suicide attempts    COLUMBIA-SUICIDE SEVERITY RATING SCALE  Psychiatric Inpatient Setting - Discharge Screener    Ask questions that are bold and underlined Discharge   Ask Questions 1 and 2 YES NO   1) Wish to be Dead:   Person endorses thoughts about a wish to be dead or not alive anymore, or wish to fall asleep and not wake up.  While you were here in the hospital, have you wished you were dead or wished you could go to sleep and not wake up?  x   2) Suicidal Thoughts:   General non-specific thoughts of wanting to end one's life/die by suicide, “I've thought about killing " myself” without general thoughts of ways to kill oneself/associated methods, intent, or plan.   While you were here in the hospital, have you actually had thoughts about killing yourself?   x   If YES to 2, ask questions 3, 4, 5, and 6.  If NO to 2, go directly to question 6   3) Suicidal Thoughts with Method (without Specific Plan or Intent to Act):   Person endorses thoughts of suicide and has thought of a least one method during the assessment period. This is different than a specific plan with time, place or method details worked out. “I thought about taking an overdose but I never made a specific plan as to when where or how I would actually do it….and I would never go through with it.”   Have you been thinking about how you might kill yourself?   x   4) Suicidal Intent (without Specific Plan):   Active suicidal thoughts of killing oneself and patient reports having some intent to act on such thoughts, as opposed to “I have the thoughts but I definitely will not do anything about them.”   Have you had these thoughts and had some intention of acting on them or do you have some intention of acting on them after you leave the hospital?   x   5) Suicide Intent with Specific Plan:   Thoughts of killing oneself with details of plan fully or partially worked out and person has some intent to carry it out.   Have you started to work out or worked out the details of how to kill yourself either for while you were here in the hospital or for after you leave the hospital? Do you intend to carry out this plan?   x     6) Suicide Behavior    While you were here in the hospital, have you done anything, started to do anything, or prepared to do anything to end your life?    Examples: Took pills, cut yourself, tried to hang yourself, took out pills but didn't swallow any because you changed your mind or someone took them from you, collected pills, secured a means of obtaining a gun, gave away valuables, wrote a will or suicide  "note, etc.  x     Family Hx of Mental Health/Substance Abuse: Pt reported most of his Fa's side struggles with mental health issues (undiagnosed) including depression, anxiety, and bipolar.     Delusions: bizarre Pt states he is struggling from telling the difference between dreams and reality which others have told him is related to \"delirium\".      Hallucinations: None    Mood: anxious and depressed     Homicidal Ideations: Absent     Abuse History: History of physical abuse: no, History of sexual abuse: no and History of verbal/emotional abuse: no     Does this require reporting: N/A    Legal History / History of Violence: The patient has no significant history of legal issues.     Sleep: Fair    Appetite: Okay but reports he gets nausaus and diarrehal after almost each meal.     Current Medical Conditions: Persistent depressive disorder, generalized anxiety disorder, panic disorder, PTSD    Current Psychiatric Medications: Xanax, Seroquel, Effexor. Toprol XL     History of Inappropriate Sexual Behavior: No    Hopelessness: Moderate    Orientation: alert and oriented to person, place, and time     Substance Abuse: occasional/rare use    COWS: N/A    CIWA: N/A    Withdrawal Symptoms: N/A     History of DT's: No    History of Seizures: No    SUBSTANCE ABUSE HISTORY:      DRUG   PRESENT USE  Y/N   AGE @ 1ST USE    ROUTE   HOW MUCH   HOW OFTEN   HOW LONG AT THIS RATE   Date of last use/  Amount used   Nicotine   Y 17 Vape Multiple puffs Daily 7 years 07/08/2021   Alcohol            Marijuana   Y 31 Delta 8   THC 3 puffs Daily with meds 3 montsh 1 month ago.    Benzos              Neurontin            Methadone            Opiates              Cocaine             Heroin            Meth            Suboxone              If in active addiction, do living arrangements affect recovery?: N/A      DATA:   This therapist received a call from [facility] staff (DESTINEE Montoya) with orders for a behavioral health consult.  The patient serves " as his own guardian and is agreeable to speak with me.  Met with patient at bedside. Patient is not under 1:1 security monitoring during assessment.  Patient is a 31 year old, single, , male residing in [place], [place]. Patient currently lives with Younger Brother but has been staying with Mo.  Patient is currently on disability.      Patient presents today with chief compliant of delusions, anxiety and depression.   Pt presented to ED withdrawal from Xanax. He was taking 6mg daily along with his other medications. He started using Delta 8 THC which he thought was a legal product but when he tested positive at his doctor's visit they cut him off from his medication. Pt immediately stopped use of the device he was using. He was hoping it could be a replacement for his xanax which he has been wanting to get off of. Pt has some xanax left but is worried about what will happen when he runs out.    Pt reports feeling hopeless because services won't see him and he does not have a lot of support. He started staying with his Mo after his doctor cut him off from his xanax and so she could support and monitor him.     The Patient does not have minor children.    Patient reports to be agreeable for treatment recommendations.     ASSESSMENT:    Therapist completed CSSRS with patient for suicide risk assessment.  The results of patient’s CSSRS suggest that patient is low risk for suicide as evidenced by denied death wish, denied self harm and denied HI.  Patient holds attention and is Cooperative with assessment.  Patient’s appearance is disheveled, unkempt.  The patient displays Aggitated psychomotor behavior. The patient's affect appears increased in intensity and mood-congruent. The patient is observed to have pressured speech and soft of speech.   Patient observed to have Poor eye contact. The patient's displays fair insight, with fair impulse control and age appropriate judgement.     PLAN:    At this time,  therapist recommends inpatient treatment based upon increased anxiety, presence of delusional reality, somatic sxs related to anxiety, lack of support, global affect, and excessive fear and worry. Patient reports to be agreeable to the recommendations.  Therapist informed [facility] ED treatment team member, DESTINEE Montoya who is agreeable to plan.      The patient would like for Christianne Matias (Mother) to be a part of their treatment. A consent has been obtained at this time.     Therapist phoned [facility] and spoke to Lisa , Lead RN, to present case.   Patient was accepted by MD [facility] as communicated by the Lead RN.  Updated patient and treatment team members who all remain agreeable for transfer. [facility] ETA is 30 minutes.    RG Ornelas

## 2021-07-09 NOTE — H&P
INITIAL PSYCHIATRIC HISTORY & PHYSICAL    Patient Identification:  Name:  Rafael Goncalves  Age:  31 y.o.  Sex:  male  :  1990  MRN:  [id number]   Visit Number:  [id number]  Primary Care Physician:  Jacky Hair MD    SUBJECTIVE    CC/Focus of Exam: benzodiazepine use    HPI: Rafael Goncalves is a 31 y.o. male who was admitted on 2021 with complaints of benzodiazepine use and withdrawals. The patient reports a long history of substance use. First use was at 23 when he was prescribed Xanax for anxiety. The patient was continued on the medication and needed more and over time his dose increased to Xanax 2 mg tid. The patient endorses symptoms of tolerance and withdrawals. Has tried to cut down and stop but has not been successful. He states he is at a point where six mg of Xanax is not helping and he doesn't want to continue taking it. Longest period of sobriety is reported to be none. He states he took delta 8 (CBD) to help him come off the Xanax and tested positive for THC and his provider discontinued Xanax.  Currently using 6 mg daily, orally.   Last use was yesterday.  Withdrawal symptoms include tremors, cold sweats, confusion. Denies hallucinations and seizures.      PAST PSYCHIATRIC HX: Has been in treatment of anxiety for a long time. One inpatient psych admission at age 18 for suicide attempt.     SUBSTANCE USE HX: Xanax use as in HPI. Uses nicotine via vaping.    SOCIAL HX:   Social History     Socioeconomic History   • Marital status: Single     Spouse name: Not on file   • Number of children: Not on file   • Years of education: Not on file   • Highest education level: Not on file   Tobacco Use   • Smoking status: Light Tobacco Smoker     Years: 14.00     Types: Cigarettes   • Smokeless tobacco: Former User   • Tobacco comment: started with use of tobacco products at 17 years   Vaping Use   • Vaping Use: Every day   • Substances: Nicotine, THC, Flavoring, quit using Delta 8 THC   •  Devices: [employer]StyleHaulble tank   Substance and Sexual Activity   • Alcohol use: No   • Drug use: Never   • Sexual activity: Not Currently     Partners: Female         Past Medical History:   Diagnosis Date   • Anxiety    • Anxiety and depression    • Hypertension    • Migraine    • Self-injurious behavior     hx at 18 years old   • Tachycardia           Past Surgical History:   Procedure Laterality Date   • MOUTH SURGERY         Family History   Problem Relation Age of Onset   • Hyperlipidemia Father    • Diabetes Father    • Anxiety disorder Father    • Depression Father    • Diabetes Maternal Grandfather    • Diabetes Paternal Grandfather    • Autoimmune disease Mother          Medications Prior to Admission   Medication Sig Dispense Refill Last Dose   • ALPRAZolam (XANAX) 1 MG tablet Take 1 mg by mouth 3 (Three) Times a Day As Needed for Anxiety or Sleep.   7/8/2021 at am   • metoprolol succinate XL (TOPROL-XL) 50 MG 24 hr tablet Take 50 mg by mouth Every Night.   7/7/2021 at pm   • QUEtiapine XR (SEROquel XR) 300 MG 24 hr tablet Take 600 mg by mouth Every Night.  0 7/7/2021 at pm   • venlafaxine XR (EFFEXOR-XR) 75 MG 24 hr capsule Take 225 mg by mouth Every Night.   7/7/2021 at pm         ALLERGIES:  Patient has no known allergies.    Temp:  [96.8 °F (36 °C)-98.3 °F (36.8 °C)] 98.2 °F (36.8 °C)  Heart Rate:  [] 152  Resp:  [16-20] 18  BP: (113-144)/(73-99) 113/76    REVIEW OF SYSTEMS:  Review of Systems   Constitutional: Positive for chills, diaphoresis and fatigue.   HENT: Negative.    Eyes: Negative.    Respiratory: Negative.    Cardiovascular: Positive for palpitations.   Gastrointestinal: Positive for nausea.   Endocrine: Negative.    Genitourinary: Negative.    Musculoskeletal: Negative.    Skin: Positive for rash.   Allergic/Immunologic: Negative.    Neurological: Positive for tremors and weakness.   Hematological: Negative.    Psychiatric/Behavioral: Positive for dysphoric mood. The patient is  nervous/anxious.         OBJECTIVE    PHYSICAL EXAM:  Physical Exam  Constitutional:  Appears well-developed and well-nourished.   HENT:   Head: Normocephalic and atraumatic.   Right Ear: External ear normal.   Left Ear: External ear normal.   Mouth/Throat: Oropharynx is clear and moist.   Eyes: Pupils are equal, round, and reactive to light. Conjunctivae and EOM are normal.   Neck: Normal range of motion. Neck supple.   Cardiovascular: Normal rate, regular rhythm and normal heart sounds.    Respiratory: Effort normal and breath sounds normal. No respiratory distress. No wheezes.   GI: Soft. Bowel sounds are normal.No distension. There is no tenderness.   Musculoskeletal: Normal range of motion. No edema or deformity.   Neurological:No cranial nerve deficit. Coordination normal.   Skin: Skin is warm and dry. No rash noted. No erythema.       MENTAL STATUS EXAM:   Hygiene:   fair  Cooperation:  Cooperative  Eye Contact:  Fair  Psychomotor Behavior:  Appropriate  Affect:  Restricted  Hopelessness: Denies  Speech:  Normal  Thought Progress:  Goal directed  Thought Content:  Normal  Suicidal:  None  Homicidal:  None  Hallucinations:  None  Delusion:  None  Memory:  Intact  Orientation:  Person, Place, Time and Situation  Reliability:  fair  Insight:  Fair  Judgement:  Fair  Impulse Control:  Fair      Imaging Results (Last 24 Hours)     ** No results found for the last 24 hours. **           ECG/EMG Results (most recent)     None           Lab Results   Component Value Date    GLUCOSE 98 07/08/2021    BUN 10 07/08/2021    CREATININE 0.89 07/08/2021    EGFRIFNONA 100 07/08/2021    BCR 11.2 07/08/2021    CO2 25.9 07/08/2021    CALCIUM 8.8 07/08/2021    ALBUMIN 4.50 07/08/2021    AST 18 07/08/2021    ALT 26 07/08/2021       Lab Results   Component Value Date    WBC 7.92 07/08/2021    HGB 17.6 07/08/2021    HCT 51.3 (H) 07/08/2021    MCV 85.4 07/08/2021     07/08/2021       Last Urine Toxicity     LAST URINE TOXICITY  RESULTS Latest Ref Rng & Units 7/8/2021    AMPHETAMINES SCREEN, URINE Negative Negative    BARBITURATES SCREEN Negative Negative    BENZODIAZEPINE SCREEN, URINE Negative Positive(A)    BUPRENORPHINEUR Negative Negative    COCAINE SCREEN, URINE Negative Negative    METHADONE SCREEN, URINE Negative Negative    METHAMPHETAMINEUR Negative Negative          Brief Urine Lab Results     None          DATA  Labs reviewed. CMP, CBC show RBCs elevated at 6.01. UDS positive for benzodiazepine, thc and TCA.   EKG reviewed. Pending  ZOIE reviewed. Patient was receiving Xanax 2 mg tid #90 per month.  Record reviewed. No previous admissions noted for psychiatric and substance use problems in this hospital.     Strengths: Motivated for treatment    Weaknesses:Substance use and Poor coping skills    Code status:  Full  Discussed code status with patient.    ASSESSMENT & PLAN:        Severe benzodiazepine use disorder (CMS/HCC)  - Ativan detox      MDD (major depressive disorder), recurrent episode, moderate (CMS/HCC)  - Continue Effexor and Seroquel      XIN (generalized anxiety disorder)  - Continue Effexor and Seroquel      Hypertension  - Continue metoprolol        The patient has been admitted for safety and stabilization.  Patient will be monitored for suicidality daily and maintained on Special Precautions Level 4 (q30 min checks).  The patient will have individual and group therapy with a master's level therapist. A master treatment plan will be developed and agreed upon by the patient and his/her treatment team.  The patient's estimated length of stay in the hospital is 5-7 days.

## 2021-07-09 NOTE — PLAN OF CARE
Goal Outcome Evaluation:  Plan of Care Reviewed With: patient  Patient Agreement with Plan of Care: agrees        Outcome Summary: client calm and cooperative. poor sleep, anxiety 5, depression 3, craving 4 and  tremors.
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
## 2021-07-09 NOTE — DISCHARGE INSTR - APPOINTMENTS
[facility]  [address] [place] [address]  [phone] - office  [phone] - 24/7 Help Line    July 15 2021 at 9:00am with Martin

## 2021-07-10 PROCEDURE — 99232 SBSQ HOSP IP/OBS MODERATE 35: CPT | Performed by: PSYCHIATRY & NEUROLOGY

## 2021-07-10 RX ADMIN — METOPROLOL SUCCINATE 50 MG: 50 TABLET, EXTENDED RELEASE ORAL at 22:06

## 2021-07-10 RX ADMIN — QUETIAPINE FUMARATE 600 MG: 300 TABLET, EXTENDED RELEASE ORAL at 22:06

## 2021-07-10 RX ADMIN — LORAZEPAM 1.5 MG: 0.5 TABLET ORAL at 21:32

## 2021-07-10 RX ADMIN — LORAZEPAM 1.5 MG: 0.5 TABLET ORAL at 14:31

## 2021-07-10 RX ADMIN — VENLAFAXINE HYDROCHLORIDE 225 MG: 150 CAPSULE, EXTENDED RELEASE ORAL at 22:05

## 2021-07-10 RX ADMIN — LORAZEPAM 2 MG: 2 TABLET ORAL at 02:00

## 2021-07-10 RX ADMIN — LORAZEPAM 1.5 MG: 0.5 TABLET ORAL at 09:04

## 2021-07-10 NOTE — PLAN OF CARE
Problem: Adult Behavioral Health Plan of Care  Goal: Plan of Care Review  Outcome: Ongoing, Progressing  Flowsheets  Taken 7/10/2021 0526  Progress: no change  Plan of Care Reviewed With: patient  Patient Agreement with Plan of Care: agrees  Outcome Summary:   Alert and oriented   reports good appetite   interrupted sleep   rates anxiety 7   depression 3   hopeless, helpless, worthless   cravings 5   w/d symptoms dry mouth, tremors, fatigue   avoids social contact   sleeping in bed throughout the evening   medications reviewed   encouraged pt to keep mask on while around others, social distance at least 6 ft apart, and wash hands frequently   verbalized understanding   pt resting well throughout the night   will continue to monitor.  Taken 7/9/2021 2013  Plan of Care Reviewed With: patient  Patient Agreement with Plan of Care: agrees  Goal: Patient-Specific Goal (Individualization)  Outcome: Ongoing, Progressing  Goal: Adheres to Safety Considerations for Self and Others  Outcome: Ongoing, Progressing  Goal: Absence of New-Onset Illness or Injury  Outcome: Ongoing, Progressing  Goal: Optimized Coping Skills in Response to Life Stressors  Outcome: Ongoing, Progressing  Goal: Develops/Participates in Therapeutic [place] to Support Successful Transition  Outcome: Ongoing, Progressing   Goal Outcome Evaluation:  Plan of Care Reviewed With: patient  Patient Agreement with Plan of Care: agrees     Progress: no change  Outcome Summary: Alert and oriented; reports good appetite; interrupted sleep; rates anxiety 7; depression 3; hopeless, helpless, worthless; cravings 5; w/d symptoms dry mouth, tremors, fatigue; avoids social contact; sleeping in bed throughout the evening; medications reviewed; encouraged pt to keep mask on while around others, social distance at least 6 ft apart, and wash hands frequently; verbalized understanding; pt resting well throughout the night; will continue to monitor.

## 2021-07-10 NOTE — PROGRESS NOTES
"INPATIENT PSYCHIATRIC PROGRESS NOTE    Name:  Rafael Goncalves  :  1990  MRN:  [id number]  Visit Number:  [id number]  Length of stay:  2    SUBJECTIVE  CC/Focus of Exam: benzodiazepine use    INTERVAL HISTORY:  The patient reports he is feeling better today.     Depression rating 5/10  Anxiety rating 8/10  Sleep: good  Withdrawal sx: denies  Cravin/10    Review of Systems   Respiratory: Negative.    Cardiovascular: Negative.    Gastrointestinal: Negative.        OBJECTIVE    Temp:  [97 °F (36.1 °C)-98.5 °F (36.9 °C)] 97 °F (36.1 °C)  Heart Rate:  [103-152] 133  Resp:  [16-20] 18  BP: (110-152)/(65-90) 127/77    MENTAL STATUS EXAM:  Appearance:Casually dressed, good hygeine.   Cooperation:Cooperative  Psychomotor: No psychomotor agitation/retardation, No EPS, No motor tics  Speech-normal rate, amount.  Mood \"anxious\"   Affect-congruent, appropriate, stable  Thought Content-goal directed, no delusional material present  Thought process-linear, organized.  Suicidality: No SI  Homicidality: No HI  Perception: No AH/VH  Insight-fair   Judgement-fair    Lab Results (last 24 hours)     ** No results found for the last 24 hours. **             Imaging Results (Last 24 Hours)     ** No results found for the last 24 hours. **             ECG/EMG Results (most recent)     None           ALLERGIES: Patient has no known allergies.      Current Facility-Administered Medications:   •  acetaminophen (TYLENOL) tablet 650 mg, 650 mg, Oral, Q6H PRN, Mando Tello MD  •  aluminum-magnesium hydroxide-simethicone (MAALOX MAX) 400-400-40 MG/5ML suspension 15 mL, 15 mL, Oral, Q6H PRN, Mando Tello MD  •  benzonatate (TESSALON) capsule 100 mg, 100 mg, Oral, TID PRN, Mando Tello MD  •  benztropine (COGENTIN) tablet 2 mg, 2 mg, Oral, Once PRN **OR** benztropine (COGENTIN) injection 1 mg, 1 mg, Intramuscular, Once PRN, Mando Tello MD  •  famotidine (PEPCID) tablet 20 mg, 20 mg, Oral, BID PRN, Mando Tello, " MD  •  hydrOXYzine (ATARAX) tablet 50 mg, 50 mg, Oral, Q6H PRN, Mando Tello MD, 50 mg at 21 1700  •  ibuprofen (ADVIL,MOTRIN) tablet 400 mg, 400 mg, Oral, Q6H PRN, Mando Tello MD  •  loperamide (IMODIUM) capsule 2 mg, 2 mg, Oral, Q2H PRN, Mando Tello MD  •  [COMPLETED] LORazepam (ATIVAN) tablet 2 mg, 2 mg, Oral, 3 times per day, 2 mg at 21 2119 **FOLLOWED BY** LORazepam (ATIVAN) tablet 1.5 mg, 1.5 mg, Oral, 3 times per day, 1.5 mg at 07/10/21 1431 **FOLLOWED BY** [START ON 2021] LORazepam (ATIVAN) tablet 1 mg, 1 mg, Oral, 3 times per day **FOLLOWED BY** [START ON 2021] LORazepam (ATIVAN) tablet 0.5 mg, 0.5 mg, Oral, 3 times per day, Mando Tello MD  •  [] LORazepam (ATIVAN) tablet 2 mg, 2 mg, Oral, Q4H PRN, 2 mg at 21 1700 **FOLLOWED BY** LORazepam (ATIVAN) tablet 1.5 mg, 1.5 mg, Oral, Q4H PRN **FOLLOWED BY** [START ON 2021] LORazepam (ATIVAN) tablet 1 mg, 1 mg, Oral, Q4H PRN **FOLLOWED BY** [START ON 2021] LORazepam (ATIVAN) tablet 0.5 mg, 0.5 mg, Oral, Q4H PRN, Mando Tello MD  •  LORazepam (ATIVAN) tablet 2 mg, 2 mg, Oral, Q2H PRN, Indio Dominguez MD, 2 mg at 07/10/21 0200  •  magnesium hydroxide (MILK OF MAGNESIA) suspension 2400 mg/10mL 10 mL, 10 mL, Oral, Daily PRN, Mando Tello MD  •  metoprolol succinate XL (TOPROL-XL) 24 hr tablet 50 mg, 50 mg, Oral, Nightly, Mando Tello MD, 50 mg at 21  •  nicotine (NICODERM CQ) 21 MG/24HR patch 1 patch, 1 patch, Transdermal, Q24H, Mando Tello MD  •  ondansetron (ZOFRAN) tablet 4 mg, 4 mg, Oral, Q6H PRN, Mando Tello MD, 4 mg at 21 1700  •  QUEtiapine XR (SEROquel XR) 24 hr tablet 600 mg, 600 mg, Oral, Nightly, Mando Tello MD, 600 mg at 21  •  sodium chloride nasal spray 2 spray, 2 spray, Each Nare, PRN, Mando Tello MD  •  traZODone (DESYREL) tablet 50 mg, 50 mg, Oral, Nightly PRN, Mando Tello MD  •  venlafaxine XR (EFFEXOR-XR) 24 hr  capsule 225 mg, 225 mg, Oral, Nightly, Mando Tello MD, 225 mg at 07/09/21 2008    ASSESSMENT & PLAN:      Severe benzodiazepine use disorder (CMS/HCC)  - Continue Ativan detox       MDD (major depressive disorder), recurrent episode, moderate (CMS/HCC)  - Continue Effexor and Seroquel       XIN (generalized anxiety disorder)  - Continue Effexor and Seroquel       Hypertension  - Continue metoprolol    Special precautions: Special Precautions Level 4 (q30 min checks).    Behavioral Health Treatment Plan and Problem List: I have reviewed and approved the Behavioral Health Treatment Plan and Problem list.  The patient has had a chance to review and agrees with the treatment plan.     Clinician:  Indio Dominguez MD  07/10/21  14:49 EDT

## 2021-07-10 NOTE — PLAN OF CARE
Goal Outcome Evaluation:  Plan of Care Reviewed With: patient  Patient Agreement with Plan of Care: agrees     Progress: improving   Patient reports improved sleep and appetite. Rates anxiety 4/10. Depression 6/10. Wd's nausea and tremors. Denies cravings or navarrete. No other issues noted. Will continue to monitor.

## 2021-07-11 PROCEDURE — 99232 SBSQ HOSP IP/OBS MODERATE 35: CPT | Performed by: PSYCHIATRY & NEUROLOGY

## 2021-07-11 RX ADMIN — METOPROLOL SUCCINATE 50 MG: 50 TABLET, EXTENDED RELEASE ORAL at 21:12

## 2021-07-11 RX ADMIN — LORAZEPAM 1 MG: 1 TABLET ORAL at 08:26

## 2021-07-11 RX ADMIN — QUETIAPINE FUMARATE 600 MG: 300 TABLET, EXTENDED RELEASE ORAL at 21:12

## 2021-07-11 RX ADMIN — VENLAFAXINE HYDROCHLORIDE 225 MG: 150 CAPSULE, EXTENDED RELEASE ORAL at 21:12

## 2021-07-11 RX ADMIN — LORAZEPAM 1 MG: 1 TABLET ORAL at 14:09

## 2021-07-11 RX ADMIN — LORAZEPAM 1 MG: 1 TABLET ORAL at 21:12

## 2021-07-11 NOTE — PLAN OF CARE
Problem: Adult Behavioral Health Plan of Care  Goal: Plan of Care Review  Outcome: Ongoing, Progressing  Goal: Patient-Specific Goal (Individualization)  Outcome: Ongoing, Progressing  Goal: Adheres to Safety Considerations for Self and Others  Outcome: Ongoing, Progressing  Intervention: Develop and Maintain Individualized Safety Plan  Description: Identify risk factors for violence to self and others at time of admission, times of risk elevation, and on discharge.  Obtain clinical history including suicidal, homicidal, combative, assaultive, or aggressive behavior.  Discuss safety concerns with patient; develop a corresponding safety plan.  Provide immediate and ongoing protective physical environment.  Conduct environment of care safety checks; monitor visitors and their possessions.  Be alert to warning signs of suicidal, homicidal, assaultive, or aggressive behavior  Note: Denial of suicidal ideation or agreement to a safety plan does not invalidate suicide risk.  Encourage frequent positive patient-staff interactions to promote verbalization of safety compromising ideations, thoughts or behaviors.  Goal: Absence of New-Onset Illness or Injury  Outcome: Ongoing, Progressing  Intervention: Identify and Manage Fall Risk  Description: Perform standard risk assessment on admission and reassess fall risk frequently, with change in status or transfer to another level of care.  Communicate fall injury risk to interprofessional healthcare team.  Determine need for increased observation, equipment and environmental modification.  Adjust safety measures to individual developmental age and stage and identified risk factors.  Reinforce the importance of safety and activity limitations to patient and family.  Perform regular intentional rounding to assess safety needs.  Goal: Optimized Coping Skills in Response to Life Stressors  Outcome: Ongoing, Progressing  Intervention: Promote Effective Coping  Strategies  Description: Identify current effective and ineffective coping strategies and strengths.  Assist with developing and use of positive coping strategies.  Utilize positive psychology techniques to enhance well-being.  Promote wellness through healthy lifestyle activities.  Consider complementary or alternative approaches.  Provide psychoeducation.  Goal: Develops/Participates in Therapeutic [place] to Support Successful Transition  Outcome: Ongoing, Progressing  Intervention: Foster Therapeutic [place]  Description: Establish rapport; develop trust relationship.  Provide a safe space for interaction; convey accept and and respect.  Normalize and validate patient experience; provide emotional support.  Identify and develop realistic, achievable recovery goals via shared decision-making.  Provide person and family-centered care; incorporate family/significant others in assessment and treatment planning.  Honor confidentiality.   Goal Outcome Evaluation:  Plan of Care Reviewed With: patient  Patient Agreement with Plan of Care: agrees        Outcome Summary: alert and oriented. Good appetite. Pt had poor sleep tonight r/t disruptive roommate. Rates anxiety 6/10 and depression 4/10. Denies HI/SI and perceptual disturbances. Reports mild headache. Tachycardic.

## 2021-07-11 NOTE — PLAN OF CARE
Goal Outcome Evaluation:  Plan of Care Reviewed With: patient  Patient Agreement with Plan of Care: agrees     Progress: improving   Patient reports improved sleep and appetite. Rates anxiety 5/10. Denies depression, cravings, or navarrete. No other issues noted at this time. Will continue to monitor.

## 2021-07-11 NOTE — PROGRESS NOTES
"INPATIENT PSYCHIATRIC PROGRESS NOTE    Name:  Rafael Goncalves  :  1990  MRN:  [id number]  Visit Number:  [id number]  Length of stay:  3    SUBJECTIVE  CC/Focus of Exam: benzodiazepine use    INTERVAL HISTORY:  The patient reports he is feeling better today and this is despite the fact that his roommate was up all night due to going through DTs.     Depression rating 4/10  Anxiety rating 5/10  Sleep: not good due to roommate  Withdrawal sx: denies  Cravin/10    Review of Systems   Respiratory: Negative.    Cardiovascular: Negative.    Gastrointestinal: Negative.        OBJECTIVE    Temp:  [97 °F (36.1 °C)-98 °F (36.7 °C)] 97.4 °F (36.3 °C)  Heart Rate:  [] 92  Resp:  [18] 18  BP: (106-146)/(60-90) 106/63    MENTAL STATUS EXAM:  Appearance:Casually dressed, good hygeine.   Cooperation:Cooperative  Psychomotor: No psychomotor agitation/retardation, No EPS, No motor tics  Speech-normal rate, amount.  Mood \"better\"   Affect-congruent, appropriate, stable  Thought Content-goal directed, no delusional material present  Thought process-linear, organized.  Suicidality: No SI  Homicidality: No HI  Perception: No AH/VH  Insight-fair   Judgement-fair    Lab Results (last 24 hours)     ** No results found for the last 24 hours. **             Imaging Results (Last 24 Hours)     ** No results found for the last 24 hours. **             ECG/EMG Results (most recent)     None           ALLERGIES: Patient has no known allergies.      Current Facility-Administered Medications:   •  acetaminophen (TYLENOL) tablet 650 mg, 650 mg, Oral, Q6H PRN, Mando Tello MD  •  aluminum-magnesium hydroxide-simethicone (MAALOX MAX) 400-400-40 MG/5ML suspension 15 mL, 15 mL, Oral, Q6H PRN, Mando Tello MD  •  benzonatate (TESSALON) capsule 100 mg, 100 mg, Oral, TID PRN, Mando Tello MD  •  benztropine (COGENTIN) tablet 2 mg, 2 mg, Oral, Once PRN **OR** benztropine (COGENTIN) injection 1 mg, 1 mg, Intramuscular, Once " PRN, Mando Tello MD  •  famotidine (PEPCID) tablet 20 mg, 20 mg, Oral, BID PRN, Mando Tello MD  •  hydrOXYzine (ATARAX) tablet 50 mg, 50 mg, Oral, Q6H PRN, Mando Tello MD, 50 mg at 21 1700  •  ibuprofen (ADVIL,MOTRIN) tablet 400 mg, 400 mg, Oral, Q6H PRN, Mando Tello MD  •  loperamide (IMODIUM) capsule 2 mg, 2 mg, Oral, Q2H PRN, Mando Tello MD  •  [COMPLETED] LORazepam (ATIVAN) tablet 2 mg, 2 mg, Oral, 3 times per day, 2 mg at 21 **FOLLOWED BY** [COMPLETED] LORazepam (ATIVAN) tablet 1.5 mg, 1.5 mg, Oral, 3 times per day, 1.5 mg at 07/10/21 2132 **FOLLOWED BY** LORazepam (ATIVAN) tablet 1 mg, 1 mg, Oral, 3 times per day, 1 mg at 21 0826 **FOLLOWED BY** [START ON 2021] LORazepam (ATIVAN) tablet 0.5 mg, 0.5 mg, Oral, 3 times per day, Mando Tello MD  •  [] LORazepam (ATIVAN) tablet 2 mg, 2 mg, Oral, Q4H PRN, 2 mg at 21 1700 **FOLLOWED BY** [] LORazepam (ATIVAN) tablet 1.5 mg, 1.5 mg, Oral, Q4H PRN **FOLLOWED BY** LORazepam (ATIVAN) tablet 1 mg, 1 mg, Oral, Q4H PRN **FOLLOWED BY** [START ON 2021] LORazepam (ATIVAN) tablet 0.5 mg, 0.5 mg, Oral, Q4H PRN, Mando Tello MD  •  magnesium hydroxide (MILK OF MAGNESIA) suspension 2400 mg/10mL 10 mL, 10 mL, Oral, Daily PRN, Mando Tello MD  •  metoprolol succinate XL (TOPROL-XL) 24 hr tablet 50 mg, 50 mg, Oral, Nightly, Mando Tello MD, 50 mg at 07/10/21 2206  •  nicotine (NICODERM CQ) 21 MG/24HR patch 1 patch, 1 patch, Transdermal, Q24H, Mando Tello MD  •  ondansetron (ZOFRAN) tablet 4 mg, 4 mg, Oral, Q6H PRN, Mando Tello MD, 4 mg at 21 170  •  QUEtiapine XR (SEROquel XR) 24 hr tablet 600 mg, 600 mg, Oral, Nightly, Mando Tello MD, 600 mg at 07/10/21 2206  •  sodium chloride nasal spray 2 spray, 2 spray, Each Nare, PRN, Mando Tello MD  •  traZODone (DESYREL) tablet 50 mg, 50 mg, Oral, Nightly PRN, Mando Tello MD  •  venlafaxine XR  (EFFEXOR-XR) 24 hr capsule 225 mg, 225 mg, Oral, Nightly, Mando Tello MD, 225 mg at 07/10/21 6491    ASSESSMENT & PLAN:      Severe benzodiazepine use disorder (CMS/HCC)  - Continue Ativan detox       MDD (major depressive disorder), recurrent episode, moderate (CMS/HCC)  - Continue Effexor and Seroquel       XIN (generalized anxiety disorder)  - Continue Effexor and Seroquel       Hypertension  - Continue metoprolol    Special precautions: Special Precautions Level 4 (q30 min checks).    Behavioral Health Treatment Plan and Problem List: I have reviewed and approved the Behavioral Health Treatment Plan and Problem list.  The patient has had a chance to review and agrees with the treatment plan.     Clinician:  Indio Dominguez MD  07/11/21  12:33 EDT

## 2021-07-12 PROCEDURE — 99232 SBSQ HOSP IP/OBS MODERATE 35: CPT | Performed by: PSYCHIATRY & NEUROLOGY

## 2021-07-12 RX ADMIN — IBUPROFEN 400 MG: 400 TABLET, FILM COATED ORAL at 14:02

## 2021-07-12 RX ADMIN — LORAZEPAM 0.5 MG: 0.5 TABLET ORAL at 21:54

## 2021-07-12 RX ADMIN — LORAZEPAM 0.5 MG: 0.5 TABLET ORAL at 14:02

## 2021-07-12 RX ADMIN — QUETIAPINE FUMARATE 600 MG: 300 TABLET, EXTENDED RELEASE ORAL at 21:55

## 2021-07-12 RX ADMIN — LORAZEPAM 0.5 MG: 0.5 TABLET ORAL at 08:07

## 2021-07-12 RX ADMIN — VENLAFAXINE HYDROCHLORIDE 225 MG: 150 CAPSULE, EXTENDED RELEASE ORAL at 21:54

## 2021-07-12 RX ADMIN — HYDROXYZINE HYDROCHLORIDE 50 MG: 50 TABLET ORAL at 14:02

## 2021-07-12 RX ADMIN — METOPROLOL SUCCINATE 50 MG: 50 TABLET, EXTENDED RELEASE ORAL at 21:56

## 2021-07-12 NOTE — PLAN OF CARE
Goal Outcome Evaluation:  Plan of Care Reviewed With: patient  Patient Agreement with Plan of Care: agrees  Consent Given to Review Plan with: [place]  Progress: improving  Outcome Summary: Met with patient in the office, assessing his needs and discussing aftercare plans. Patient agreeable.      Problem: Adult Behavioral Health Plan of Care  Goal: Plan of Care Review  Outcome: Ongoing, Progressing  Flowsheets  Taken 7/12/2021 1443  Progress: improving  Plan of Care Reviewed With: patient  Patient Agreement with Plan of Care: agrees  Outcome Summary: Met with patient in the office, assessing his needs and discussing aftercare plans. Patient agreeable.  Taken 7/9/2021 1137  Consent Given to Review Plan with: [place]       [id number]  D: Patient reported he was feeling better today. He was feeling relatively confident about going home and dealing with his anxiety without benzodiazepines. However, he was concerned about post-acute withdrawals. Therapist normalized his concern. Patient receptive. Patient reported he had things to look forward too upon discharge. He had his plants to take care of. He also had novels he was eager to read again.     A: Patient's affect appeared mildly anxious. He was polite and cooperative. Seemed to have a positive attitude. Appeared to have a good degree of motivation to change as well, as he seemed determined to maintain cessation from benzos.     P: Patient will continue hospitalization. He will follow up with [place]. Mother will provide transportation tomorrow.

## 2021-07-12 NOTE — PLAN OF CARE
Problem: Adult Behavioral Health Plan of Care  Goal: Plan of Care Review  Outcome: Ongoing, Progressing  Flowsheets  Taken 7/12/2021 1527  Plan of Care Reviewed With: patient  Patient Agreement with Plan of Care: agrees  Outcome Summary: client slept good, blood pressure elevated this morning.  Taken 7/12/2021 1033  Plan of Care Reviewed With: patient  Patient Agreement with Plan of Care: agrees   Goal Outcome Evaluation:  Plan of Care Reviewed With: patient  Patient Agreement with Plan of Care: agrees        Outcome Summary: client slept good, blood pressure elevated this morning.

## 2021-07-12 NOTE — PROGRESS NOTES
"INPATIENT PSYCHIATRIC PROGRESS NOTE    Name:  Rafael Goncalves  :  1990  MRN:  [id number]  Visit Number:  [id number]  Length of stay:  4    SUBJECTIVE  CC/Focus of Exam: benzodiazepine use    INTERVAL HISTORY:  The patient is on last day of detox and states he is feeling better and is hoping to be discharged tomorrow and continue outpatient care.     Depression rating 4/10  Anxiety rating 6/10  Sleep: not good due to roommate  Withdrawal sx: denies  Cravin/10    Review of Systems   Respiratory: Negative.    Cardiovascular: Negative.    Gastrointestinal: Negative.        OBJECTIVE    Temp:  [97.3 °F (36.3 °C)-98 °F (36.7 °C)] 97.4 °F (36.3 °C)  Heart Rate:  [] 119  Resp:  [16-18] 18  BP: (117-166)/(55-95) 152/81    MENTAL STATUS EXAM:  Appearance:Casually dressed, good hygeine.   Cooperation:Cooperative  Psychomotor: No psychomotor agitation/retardation, No EPS, No motor tics  Speech-normal rate, amount.  Mood \"better\"   Affect-congruent, appropriate, stable  Thought Content-goal directed, no delusional material present  Thought process-linear, organized.  Suicidality: No SI  Homicidality: No HI  Perception: No AH/VH  Insight-fair   Judgement-fair    Lab Results (last 24 hours)     ** No results found for the last 24 hours. **             Imaging Results (Last 24 Hours)     ** No results found for the last 24 hours. **             ECG/EMG Results (most recent)     None           ALLERGIES: Patient has no known allergies.      Current Facility-Administered Medications:   •  acetaminophen (TYLENOL) tablet 650 mg, 650 mg, Oral, Q6H PRN, Mando Tello MD  •  aluminum-magnesium hydroxide-simethicone (MAALOX MAX) 400-400-40 MG/5ML suspension 15 mL, 15 mL, Oral, Q6H PRN, Mando Tello MD  •  benzonatate (TESSALON) capsule 100 mg, 100 mg, Oral, TID PRN, Mando Tello MD  •  benztropine (COGENTIN) tablet 2 mg, 2 mg, Oral, Once PRN **OR** benztropine (COGENTIN) injection 1 mg, 1 mg, Intramuscular, " Once PRN, Mando Tello MD  •  famotidine (PEPCID) tablet 20 mg, 20 mg, Oral, BID PRN, Mando Tello MD  •  hydrOXYzine (ATARAX) tablet 50 mg, 50 mg, Oral, Q6H PRN, Mando Tello MD, 50 mg at 21 1700  •  ibuprofen (ADVIL,MOTRIN) tablet 400 mg, 400 mg, Oral, Q6H PRN, Mando Tello MD  •  loperamide (IMODIUM) capsule 2 mg, 2 mg, Oral, Q2H PRN, Mando Tello MD  •  [COMPLETED] LORazepam (ATIVAN) tablet 2 mg, 2 mg, Oral, 3 times per day, 2 mg at 21 **FOLLOWED BY** [COMPLETED] LORazepam (ATIVAN) tablet 1.5 mg, 1.5 mg, Oral, 3 times per day, 1.5 mg at 07/10/21 2132 **FOLLOWED BY** [COMPLETED] LORazepam (ATIVAN) tablet 1 mg, 1 mg, Oral, 3 times per day, 1 mg at 21 **FOLLOWED BY** LORazepam (ATIVAN) tablet 0.5 mg, 0.5 mg, Oral, 3 times per day, Mando Tello MD, 0.5 mg at 21 0807  •  [] LORazepam (ATIVAN) tablet 2 mg, 2 mg, Oral, Q4H PRN, 2 mg at 21 1700 **FOLLOWED BY** [] LORazepam (ATIVAN) tablet 1.5 mg, 1.5 mg, Oral, Q4H PRN **FOLLOWED BY** [] LORazepam (ATIVAN) tablet 1 mg, 1 mg, Oral, Q4H PRN **FOLLOWED BY** LORazepam (ATIVAN) tablet 0.5 mg, 0.5 mg, Oral, Q4H PRN, Mando Tello MD  •  magnesium hydroxide (MILK OF MAGNESIA) suspension 2400 mg/10mL 10 mL, 10 mL, Oral, Daily PRN, Mando Tello MD  •  metoprolol succinate XL (TOPROL-XL) 24 hr tablet 50 mg, 50 mg, Oral, Nightly, Mando Tello MD, 50 mg at 21  •  nicotine (NICODERM CQ) 21 MG/24HR patch 1 patch, 1 patch, Transdermal, Q24H, Mando Tello MD  •  ondansetron (ZOFRAN) tablet 4 mg, 4 mg, Oral, Q6H PRN, Mando Tello MD, 4 mg at 21 1700  •  QUEtiapine XR (SEROquel XR) 24 hr tablet 600 mg, 600 mg, Oral, Nightly, Mando Tello MD, 600 mg at 21  •  sodium chloride nasal spray 2 spray, 2 spray, Each Nare, PRN, Mando Tello MD  •  traZODone (DESYREL) tablet 50 mg, 50 mg, Oral, Nightly PRN, Mando Tello MD  •  venlafaxine XR  (EFFEXOR-XR) 24 hr capsule 225 mg, 225 mg, Oral, Nightly, Mando Tello MD, 225 mg at 07/11/21 2112    ASSESSMENT & PLAN:      Severe benzodiazepine use disorder (CMS/HCC)  - Continue Ativan detox       MDD (major depressive disorder), recurrent episode, moderate (CMS/HCC)  - Continue Effexor and Seroquel       XIN (generalized anxiety disorder)  - Continue Effexor and Seroquel       Hypertension  - Continue metoprolol    Special precautions: Special Precautions Level 4 (q30 min checks).    Behavioral Health Treatment Plan and Problem List: I have reviewed and approved the Behavioral Health Treatment Plan and Problem list.  The patient has had a chance to review and agrees with the treatment plan.     Clinician:  Indio Dominguez MD  07/12/21  11:17 EDT

## 2021-07-12 NOTE — PLAN OF CARE
Problem: Adult Behavioral Health Plan of Care  Goal: Plan of Care Review  Outcome: Ongoing, Progressing  Goal: Patient-Specific Goal (Individualization)  Outcome: Ongoing, Progressing  Goal: Adheres to Safety Considerations for Self and Others  Outcome: Ongoing, Progressing  Intervention: Develop and Maintain Individualized Safety Plan  Description: Identify risk factors for violence to self and others at time of admission, times of risk elevation, and on discharge.  Obtain clinical history including suicidal, homicidal, combative, assaultive, or aggressive behavior.  Discuss safety concerns with patient; develop a corresponding safety plan.  Provide immediate and ongoing protective physical environment.  Conduct environment of care safety checks; monitor visitors and their possessions.  Be alert to warning signs of suicidal, homicidal, assaultive, or aggressive behavior  Note: Denial of suicidal ideation or agreement to a safety plan does not invalidate suicide risk.  Encourage frequent positive patient-staff interactions to promote verbalization of safety compromising ideations, thoughts or behaviors.  Goal: Absence of New-Onset Illness or Injury  Outcome: Ongoing, Progressing  Intervention: Identify and Manage Fall Risk  Description: Perform standard risk assessment on admission and reassess fall risk frequently, with change in status or transfer to another level of care.  Communicate fall injury risk to interprofessional healthcare team.  Determine need for increased observation, equipment and environmental modification.  Adjust safety measures to individual developmental age and stage and identified risk factors.  Reinforce the importance of safety and activity limitations to patient and family.  Perform regular intentional rounding to assess safety needs.  Goal: Optimized Coping Skills in Response to Life Stressors  Outcome: Ongoing, Progressing  Intervention: Promote Effective Coping  Strategies  Description: Identify current effective and ineffective coping strategies and strengths.  Assist with developing and use of positive coping strategies.  Utilize positive psychology techniques to enhance well-being.  Promote wellness through healthy lifestyle activities.  Consider complementary or alternative approaches.  Provide psychoeducation.  Goal: Develops/Participates in Therapeutic [place] to Support Successful Transition  Outcome: Ongoing, Progressing  Intervention: Foster Therapeutic [place]  Description: Establish rapport; develop trust relationship.  Provide a safe space for interaction; convey accept and and respect.  Normalize and validate patient experience; provide emotional support.  Identify and develop realistic, achievable recovery goals via shared decision-making.  Provide person and family-centered care; incorporate family/significant others in assessment and treatment planning.  Honor confidentiality.   Goal Outcome Evaluation:  Plan of Care Reviewed With: patient  Patient Agreement with Plan of Care: agrees        Outcome Summary: A&O. Appetite good. Sleep disturbances reported. Rates anxiety 5/10 and depression 6/10. Continues to have fine tremors and tachycardia. Denies SI/ HI and perceptual disturbances.

## 2021-07-13 VITALS
WEIGHT: 200.4 LBS | SYSTOLIC BLOOD PRESSURE: 139 MMHG | BODY MASS INDEX: 30.37 KG/M2 | TEMPERATURE: 97.7 F | RESPIRATION RATE: 16 BRPM | HEART RATE: 112 BPM | OXYGEN SATURATION: 96 % | DIASTOLIC BLOOD PRESSURE: 90 MMHG | HEIGHT: 68 IN

## 2021-07-13 PROCEDURE — 99238 HOSP IP/OBS DSCHRG MGMT 30/<: CPT | Performed by: PSYCHIATRY & NEUROLOGY

## 2021-07-13 NOTE — NURSING NOTE
"Reviewed MAR with Patient including prn Hydroxizine. Patient declines, denies complaints states \" I'm fine\"   "

## 2021-07-13 NOTE — DISCHARGE SUMMARY
":  1990  MRN:  [id number]  Visit Number:  [id number]      Date of Admission:2021   Date of Discharge:  2021    Discharge Diagnosis:  Active Problems:    Severe benzodiazepine use disorder (CMS/HCC)    MDD (major depressive disorder), recurrent episode, moderate (CMS/HCC)    XIN (generalized anxiety disorder)    Hypertension        Admission Diagnosis:  Substance abuse (CMS/HCC) [F19.10]     HPI  Rafael Goncalves is a 31 y.o. male who was admitted on 2021 with complaints of benzodiazepine use and withdrawals.  For details please see history and physical dated 2021.      Hospital Course  Patient is a 31 y.o. male presented with benzodiazepine use and withdrawals. The patient was admitted to the [facility] detox recovery unit for safety, further evaluation and treatment.  The patient was started on Ativan detox and he was able to complete without any complications.  He was continued on Effexor and Seroquel for MDD and XIN.  Also continued on metoprolol for hypertension.  The patient was also able to take part in individual and group counseling sessions and work on appropriate coping skills.  The patient made steady improvement in his withdrawal symptoms and mood and expressed feeling more positive and hopeful about future. Sleep and appetite were improved.  The day of discharge the patient was calm, cooperative and pleasant. Mood was reported to be good, and denied SI/HI/AVH. Also reported no medication side effects.  .      Mental Status Exam upon discharge:   Mood \" better\"   Affect-congruent, appropriate, stable  Thought Content-goal directed, no delusional material present  Thought process-linear, organized.  Suicidality: No SI  Homicidality: No HI  Perception: No AH/VH    Procedures Performed         Consults:   Consults     No orders found from 2021 to 2021.          Pertinent Test Results:   Admission on 2021   Component Date Value Ref Range Status   • COVID19 " 07/08/2021 Not Detected  Not Detected - Ref. Range Final   • Influenza A PCR 07/08/2021 Not Detected  Not Detected Final   • Influenza B PCR 07/08/2021 Not Detected  Not Detected Final   Admission on 07/08/2021, Discharged on 07/08/2021   Component Date Value Ref Range Status   • Glucose 07/08/2021 98  65 - 99 mg/dL Final   • BUN 07/08/2021 10  6 - 20 mg/dL Final   • Creatinine 07/08/2021 0.89  0.76 - 1.27 mg/dL Final   • Sodium 07/08/2021 137  136 - 145 mmol/L Final   • Potassium 07/08/2021 4.0  3.5 - 5.2 mmol/L Final   • Chloride 07/08/2021 100  98 - 107 mmol/L Final   • CO2 07/08/2021 25.9  22.0 - 29.0 mmol/L Final   • Calcium 07/08/2021 8.8  8.6 - 10.5 mg/dL Final   • Total Protein 07/08/2021 7.5  6.0 - 8.5 g/dL Final   • Albumin 07/08/2021 4.50  3.50 - 5.20 g/dL Final   • ALT (SGPT) 07/08/2021 26  1 - 41 U/L Final   • AST (SGOT) 07/08/2021 18  1 - 40 U/L Final   • Alkaline Phosphatase 07/08/2021 58  39 - 117 U/L Final   • Total Bilirubin 07/08/2021 0.4  0.0 - 1.2 mg/dL Final   • eGFR Non African Amer 07/08/2021 100  >60 mL/min/1.73 Final   • Globulin 07/08/2021 3.0  gm/dL Final   • A/G Ratio 07/08/2021 1.5  g/dL Final   • BUN/Creatinine Ratio 07/08/2021 11.2  7.0 - 25.0 Final   • Anion Gap 07/08/2021 11.1  5.0 - 15.0 mmol/L Final   • Acetaminophen 07/08/2021 <5.0  0.0 - 30.0 mcg/mL Final   • Ethanol 07/08/2021 <10  0 - 10 mg/dL Final   • Ethanol % 07/08/2021 <0.010  % Final   • Salicylate 07/08/2021 <0.3  <=30.0 mg/dL Final   • THC, Screen, Urine 07/08/2021 Positive* Negative Final   • Phencyclidine (PCP), Urine 07/08/2021 Negative  Negative Final   • Cocaine Screen, Urine 07/08/2021 Negative  Negative Final   • Methamphetamine, Ur 07/08/2021 Negative  Negative Final   • Opiate Screen 07/08/2021 Negative  Negative Final   • Amphetamine Screen, Urine 07/08/2021 Negative  Negative Final   • Benzodiazepine Screen, Urine 07/08/2021 Positive* Negative Final   • Tricyclic Antidepressants Screen 07/08/2021 Positive*  Negative Final   • Methadone Screen, Urine 07/08/2021 Negative  Negative Final   • Barbiturates Screen, Urine 07/08/2021 Negative  Negative Final   • Oxycodone Screen, Urine 07/08/2021 Negative  Negative Final   • Propoxyphene Screen 07/08/2021 Negative  Negative Final   • Buprenorphine, Screen, Urine 07/08/2021 Negative  Negative Final   • Extra Tube 07/08/2021 hold for add-on   Final    Auto resulted   • Extra Tube 07/08/2021 Hold for add-ons.   Final    Auto resulted.   • WBC 07/08/2021 7.92  3.40 - 10.80 10*3/mm3 Final   • RBC 07/08/2021 6.01* 4.14 - 5.80 10*6/mm3 Final   • Hemoglobin 07/08/2021 17.6  13.0 - 17.7 g/dL Final   • Hematocrit 07/08/2021 51.3* 37.5 - 51.0 % Final   • MCV 07/08/2021 85.4  79.0 - 97.0 fL Final   • MCH 07/08/2021 29.3  26.6 - 33.0 pg Final   • MCHC 07/08/2021 34.3  31.5 - 35.7 g/dL Final   • RDW 07/08/2021 12.3  12.3 - 15.4 % Final   • RDW-SD 07/08/2021 38.2  37.0 - 54.0 fl Final   • MPV 07/08/2021 9.3  6.0 - 12.0 fL Final   • Platelets 07/08/2021 268  140 - 450 10*3/mm3 Final   • Neutrophil % 07/08/2021 65.4  42.7 - 76.0 % Final   • Lymphocyte % 07/08/2021 25.8  19.6 - 45.3 % Final   • Monocyte % 07/08/2021 6.9  5.0 - 12.0 % Final   • Eosinophil % 07/08/2021 1.0  0.3 - 6.2 % Final   • Basophil % 07/08/2021 0.5  0.0 - 1.5 % Final   • Immature Grans % 07/08/2021 0.4  0.0 - 0.5 % Final   • Neutrophils, Absolute 07/08/2021 5.18  1.70 - 7.00 10*3/mm3 Final   • Lymphocytes, Absolute 07/08/2021 2.04  0.70 - 3.10 10*3/mm3 Final   • Monocytes, Absolute 07/08/2021 0.55  0.10 - 0.90 10*3/mm3 Final   • Eosinophils, Absolute 07/08/2021 0.08  0.00 - 0.40 10*3/mm3 Final   • Basophils, Absolute 07/08/2021 0.04  0.00 - 0.20 10*3/mm3 Final   • Immature Grans, Absolute 07/08/2021 0.03  0.00 - 0.05 10*3/mm3 Final   • nRBC 07/08/2021 0.0  0.0 - 0.2 /100 WBC Final        Condition on Discharge:  improved    Vital Signs  Temp:  [96.8 °F (36 °C)-98.1 °F (36.7 °C)] 97.7 °F (36.5 °C)  Heart Rate:  []  112  Resp:  [16-18] 16  BP: (119-161)/(72-90) 139/90      Discharge Disposition:  Home or Self Care    Discharge Medications:     Discharge Medications      Continue These Medications      Instructions Start Date   metoprolol succinate XL 50 MG 24 hr tablet  Commonly known as: TOPROL-XL   50 mg, Oral, Nightly      QUEtiapine  MG 24 hr tablet  Commonly known as: SEROquel XR   600 mg, Oral, Nightly      venlafaxine XR 75 MG 24 hr capsule  Commonly known as: EFFEXOR-XR   225 mg, Oral, Nightly         Stop These Medications    ALPRAZolam 1 MG tablet  Commonly known as: XANAX            Discharge Diet: Regular     Activity at Discharge: As tolerated     Follow-up Appointments        [facility]  [facility] [place] [address]  [phone] - office  [phone] - 24/7 Help Line     July 15 2021 at 9:00am with Martin            Time spent in discharge: Less than 30 minutes    Clinician:   Indio Dominguez MD  07/13/21  10:01 EDT

## 2021-07-14 ENCOUNTER — HOSPITAL ENCOUNTER (EMERGENCY)
Facility: HOSPITAL | Age: 31
Discharge: LEFT BEFORE DISCHARGE | End: 2021-07-14

## 2021-07-14 VITALS
SYSTOLIC BLOOD PRESSURE: 135 MMHG | RESPIRATION RATE: 20 BRPM | BODY MASS INDEX: 30.92 KG/M2 | DIASTOLIC BLOOD PRESSURE: 97 MMHG | WEIGHT: 204 LBS | OXYGEN SATURATION: 98 % | TEMPERATURE: 98.1 F | HEART RATE: 109 BPM | HEIGHT: 68 IN

## 2021-07-14 PROCEDURE — 99211 OFF/OP EST MAY X REQ PHY/QHP: CPT

## 2021-07-16 ENCOUNTER — HOSPITAL ENCOUNTER (EMERGENCY)
Facility: HOSPITAL | Age: 31
Discharge: HOME | End: 2021-07-16
Attending: EMERGENCY MEDICINE | Admitting: EMERGENCY MEDICINE

## 2021-07-16 VITALS
WEIGHT: 202.2 LBS | SYSTOLIC BLOOD PRESSURE: 128 MMHG | OXYGEN SATURATION: 99 % | DIASTOLIC BLOOD PRESSURE: 82 MMHG | HEART RATE: 80 BPM | BODY MASS INDEX: 30.65 KG/M2 | TEMPERATURE: 98 F | RESPIRATION RATE: 16 BRPM | HEIGHT: 68 IN

## 2021-07-16 DIAGNOSIS — F41.9 ANXIETY: Primary | ICD-10-CM

## 2021-07-16 DIAGNOSIS — F13.930 BENZODIAZEPINE WITHDRAWAL, UNCOMPLICATED (HCC): ICD-10-CM

## 2021-07-16 LAB
AMPHET+METHAMPHET UR QL: NEGATIVE
AMPHETAMINES UR QL: NEGATIVE
BARBITURATES UR QL SCN: NEGATIVE
BENZODIAZ UR QL SCN: NEGATIVE
BUPRENORPHINE SERPL-MCNC: NEGATIVE NG/ML
CANNABINOIDS SERPL QL: POSITIVE
COCAINE UR QL: NEGATIVE
METHADONE UR QL SCN: NEGATIVE
OPIATES UR QL: NEGATIVE
OXYCODONE UR QL SCN: NEGATIVE
PCP UR QL SCN: NEGATIVE
PROPOXYPH UR QL: NEGATIVE
TRICYCLICS UR QL SCN: POSITIVE

## 2021-07-16 PROCEDURE — 99283 EMERGENCY DEPT VISIT LOW MDM: CPT

## 2021-07-16 PROCEDURE — 80306 DRUG TEST PRSMV INSTRMNT: CPT | Performed by: EMERGENCY MEDICINE

## 2021-07-17 ENCOUNTER — DOCUMENTATION (OUTPATIENT)
Dept: EMERGENCY DEPT | Facility: HOSPITAL | Age: 31
End: 2021-07-17

## 2021-07-17 NOTE — ED NOTES
Rafael Goncalves  1990    TIME: [id number]    Is patient agreeable to admission/treatment? Yes    Guardian: (Must have paperwork) FAMILY MEMBER - GUARDIAN: self    Pt Lives With:  Pt currently lives with his brother but has been staying at his fathers house reporting fear that he might have a seizure while at home alone     Highest Level of Education: high school diploma/GED     Presenting Problems: (How is the patient a danger to self or others?) Pt presents to the ED today with symptoms of detox. Pt denies current SI or self-injurious behaviors    Current Stressors: chemical dependency/abuse, loss of independence, medical diagnosis/condition and mental health condition    Depression: Pt did not report depressed symptoms but reported increased fear due to detox symtpoms and not having a medical plan.     Anxiety: pt reports a hx of panic disorder, XIN, PTSD. Pt reported increased anxiety due to detox symptoms and fear that he could have a seizure    Previous Psychiatric Treatment: Yes    If yes, describe: outpatient therapy, detox, medication managament    Last inpatient admission: pt reported that he was discharged from [facility] for detox on 7/13/2021    Number of admissions: 2    Last outpatient visit: 7/15/2021- [facility] with Martin    Suicidal: Absent    Previous Attempts: no prior suicide attempts      [place]-SUICIDE SEVERITY RATING SCALE  Psychiatric Inpatient Setting - Discharge Screener    Ask questions that are bold and underlined Discharge   Ask Questions 1 and 2 YES NO   1) Wish to be Dead:   Person endorses thoughts about a wish to be dead or not alive anymore, or wish to fall asleep and not wake up.  While you were here in the hospital, have you wished you were dead or wished you could go to sleep and not wake up?  x   2) Suicidal Thoughts:   General non-specific thoughts of wanting to end one's life/die by suicide, “I've thought about killing myself” without general thoughts of ways to kill  oneself/associated methods, intent, or plan.   While you were here in the hospital, have you actually had thoughts about killing yourself?   x   If YES to 2, ask questions 3, 4, 5, and 6.  If NO to 2, go directly to question 6   3) Suicidal Thoughts with Method (without Specific Plan or Intent to Act):   Person endorses thoughts of suicide and has thought of a least one method during the assessment period. This is different than a specific plan with time, place or method details worked out. “I thought about taking an overdose but I never made a specific plan as to when where or how I would actually do it….and I would never go through with it.”   Have you been thinking about how you might kill yourself?   xx   4) Suicidal Intent (without Specific Plan):   Active suicidal thoughts of killing oneself and patient reports having some intent to act on such thoughts, as opposed to “I have the thoughts but I definitely will not do anything about them.”   Have you had these thoughts and had some intention of acting on them or do you have some intention of acting on them after you leave the hospital?   x   5) Suicide Intent with Specific Plan:   Thoughts of killing oneself with details of plan fully or partially worked out and person has some intent to carry it out.   Have you started to work out or worked out the details of how to kill yourself either for while you were here in the hospital or for after you leave the hospital? Do you intend to carry out this plan?   x     6) Suicide Behavior    While you were here in the hospital, have you done anything, started to do anything, or prepared to do anything to end your life?    Examples: Took pills, cut yourself, tried to hang yourself, took out pills but didn't swallow any because you changed your mind or someone took them from you, collected pills, secured a means of obtaining a gun, gave away valuables, wrote a will or suicide note, etc.  x       Family Hx of Mental  Health/Substance Abuse: depression, anxiety and bipolar    Delusions: pt presents with goal oriented thought  processing     Hallucinations: None and Not demonstrated today    Mood: anxious     Homicidal Ideations: Absent     Abuse History: Further details: pt denies     Does this require reporting: N/A    Legal History / History of Violence: The patient has no significant history of legal issues.     Sleep: Fair    Appetite: Fair    Current Medical Conditions: Yes    If yes, explain: MDD, PTSD, Panic disorder, XIN, hx of hypertension    Current Psychiatric Medications: metoprolol, seroquel, effexor     History of Inappropriate Sexual Behavior: N/A    Hopelessness: Mild    Orientation: alert and oriented to person, place, and time     Substance Abuse: occasional/rare use    COWS: 0    CIWA: 0    Withdrawal Symptoms: Nausea/Vomiting/Diarrhea; cold chills, sweating, tremors     History of DT's: No    History of Seizures: No    SUBSTANCE ABUSE HISTORY:      DRUG   PRESENT USE  Y/N   AGE @ 1ST USE    ROUTE   HOW MUCH   HOW OFTEN   HOW LONG AT THIS RATE   Date of last use/  Amount used   Nicotine   y 17 vape Less than a tank daily 7 years 7/16/2021   Alcohol            Marijuana   y 31 smoking 1 hit on Delta 8  3x daily 1 months 1 month ago per pt   Benzos     n  As prescribed 6 mg xanax daily 4 years    Neurontin            Methadone            Opiates              Cocaine             Heroin            Meth            Suboxone                  DATA:   This therapist received a call from [facility] staff (JOE Villalta) with orders from (MD shazia) for a behavioral health consult.  The patient serves as his own guardian and is agreeable to speak with me.  Met with patient at bedside. Patient is not under 1:1 security monitoring during assessment.  Patient is a 31 year old, single, , male residing in [place], [place]. Patient currently lives with his brother but has been staying with his dad since Wednesday due to fear  of having a seizure with detox symptoms.  Patient is unemployed, disabled.      Patient presents today with chief compliant of substance abuse and anxiety.  Pt presents to the ED today with symptoms of detox. The pt is reporting that he has been experiencing high levels of anxiety regarding detox symptoms and being unsure if he is going to have a seizure. The pt reported diarrhea, nausea, increased sweating, cold chills, and tremors. Pt denies AH/VH and denies a hx of seizures. The pt just discharged from detox on Tuesday at [facility]. The pt reported that he was using 6 mg of xanax daily (2 mg, 3 times daily) but was trying to ween himself off of it by himself because his doctor would only stop it cold turkey or not at all. The pt reported that while trying to ween himself down, he started to use delta 8 (THC) as a way to help decrease his anxiety and reported that he was buying it from the store. The pt reported once learning it was illegal he stopped using it and told his provider (PCP) but that upon his drug screen showing THC his provider stopped the xanax cold turkey. The pt reported that he was instructed that if detox symptoms started to come to the ED for possible detox inpatient treatment. The pt reported that he did this and spent 5 days at [facility]. The pt reported upon leaving [facility] he felt better for about a day before symptoms started. The pt reports increased symptoms of Post acute withdrawal and anxiety. The therapist informed the pt the likely sharma of having a seizure at this time was very slim due to urinalysis coming back clean from benzos, opiates, and alcohol. The pt denied current SI, self injurious behaviors, HI, and AH/VH.     The pt is currently in treatment at [facility] for counseling and has been seen at [facility] for many years but reported that he wants to see a psychiatrist and was agreeable to an APRN who specializes in mental health for medication management. The pt reported that  he was referred to the APRN at [facility] but that he has not seen her yet and wants to try to get into treatment earlier for medication management.       Patient reports to be agreeable for treatment recommendations.     ASSESSMENT:    Therapist completed CSSRS with patient for suicide risk assessment.  The results of patient’s CSSRS suggest that patient is low risk for suicide as evidenced by lack of SI, HI, and self  injurious behaviors.  Patient holds attention and is Cooperative with assessment.  Patient’s appearance is clean and casually dressed, appropriate.  The patient displays Restless psychomotor behavior. The patient's affect appears mood-congruent. The patient is observed to have normal rate, tone and rhythm of speech.   Patient observed to have Good eye contact. The patient's displays fair insight, with fair impulse control and fair judgement.     PLAN:    At this time, therapist recommends outpatient treatment based upon above assessment . Patient reports to be agreeable to the recommendations.  Therapist informed [facility] ED treatment team members, JOE Villalta and MD Uday who are agreeable to plan.  The pt was agreeable to returning to the ED if symptoms or anxiety worsened before Monday. The pt reported that he was agreeable to a referral to [facility] Behavioral Health Outpatient for Medication Management.    Kelly Wiley, [facility]     Kelly Wiley, [facility]  07/17/21 0749

## 2021-07-20 ENCOUNTER — HOSPITAL ENCOUNTER (EMERGENCY)
Facility: HOSPITAL | Age: 31
Discharge: HOME | End: 2021-07-20
Attending: EMERGENCY MEDICINE | Admitting: EMERGENCY MEDICINE

## 2021-07-20 VITALS
WEIGHT: 203.4 LBS | HEIGHT: 68 IN | SYSTOLIC BLOOD PRESSURE: 127 MMHG | DIASTOLIC BLOOD PRESSURE: 90 MMHG | OXYGEN SATURATION: 97 % | HEART RATE: 113 BPM | TEMPERATURE: 97.5 F | RESPIRATION RATE: 20 BRPM | BODY MASS INDEX: 30.83 KG/M2

## 2021-07-20 DIAGNOSIS — F41.9 ANXIETY: Primary | ICD-10-CM

## 2021-07-20 LAB
ALBUMIN SERPL-MCNC: 4.6 G/DL (ref 3.5–5.2)
ALBUMIN/GLOB SERPL: 1.5 G/DL
ALP SERPL-CCNC: 53 U/L (ref 39–117)
ALT SERPL W P-5'-P-CCNC: 25 U/L (ref 1–41)
AMPHET+METHAMPHET UR QL: NEGATIVE
AMPHETAMINES UR QL: NEGATIVE
ANION GAP SERPL CALCULATED.3IONS-SCNC: 12.1 MMOL/L (ref 5–15)
APAP SERPL-MCNC: <5 MCG/ML (ref 0–30)
AST SERPL-CCNC: 17 U/L (ref 1–40)
BARBITURATES UR QL SCN: NEGATIVE
BASOPHILS # BLD AUTO: 0.02 10*3/MM3 (ref 0–0.2)
BASOPHILS NFR BLD AUTO: 0.2 % (ref 0–1.5)
BENZODIAZ UR QL SCN: NEGATIVE
BILIRUB SERPL-MCNC: 0.5 MG/DL (ref 0–1.2)
BILIRUB UR QL STRIP: NEGATIVE
BUN SERPL-MCNC: 7 MG/DL (ref 6–20)
BUN/CREAT SERPL: 8.3 (ref 7–25)
BUPRENORPHINE SERPL-MCNC: NEGATIVE NG/ML
CALCIUM SPEC-SCNC: 9.9 MG/DL (ref 8.6–10.5)
CANNABINOIDS SERPL QL: POSITIVE
CHLORIDE SERPL-SCNC: 101 MMOL/L (ref 98–107)
CLARITY UR: CLEAR
CO2 SERPL-SCNC: 26.9 MMOL/L (ref 22–29)
COCAINE UR QL: NEGATIVE
COLOR UR: YELLOW
CREAT SERPL-MCNC: 0.84 MG/DL (ref 0.76–1.27)
DEPRECATED RDW RBC AUTO: 38.6 FL (ref 37–54)
EOSINOPHIL # BLD AUTO: 0.04 10*3/MM3 (ref 0–0.4)
EOSINOPHIL NFR BLD AUTO: 0.5 % (ref 0.3–6.2)
ERYTHROCYTE [DISTWIDTH] IN BLOOD BY AUTOMATED COUNT: 12.3 % (ref 12.3–15.4)
ETHANOL BLD-MCNC: <10 MG/DL (ref 0–10)
ETHANOL UR QL: <0.01 %
GFR SERPL CREATININE-BSD FRML MDRD: 107 ML/MIN/1.73
GLOBULIN UR ELPH-MCNC: 3.1 GM/DL
GLUCOSE SERPL-MCNC: 91 MG/DL (ref 65–99)
GLUCOSE UR STRIP-MCNC: NEGATIVE MG/DL
HCT VFR BLD AUTO: 52.8 % (ref 37.5–51)
HGB BLD-MCNC: 17.9 G/DL (ref 13–17.7)
HGB UR QL STRIP.AUTO: NEGATIVE
IMM GRANULOCYTES # BLD AUTO: 0.05 10*3/MM3 (ref 0–0.05)
IMM GRANULOCYTES NFR BLD AUTO: 0.6 % (ref 0–0.5)
KETONES UR QL STRIP: NEGATIVE
LEUKOCYTE ESTERASE UR QL STRIP.AUTO: NEGATIVE
LYMPHOCYTES # BLD AUTO: 1.92 10*3/MM3 (ref 0.7–3.1)
LYMPHOCYTES NFR BLD AUTO: 22.3 % (ref 19.6–45.3)
MCH RBC QN AUTO: 29.2 PG (ref 26.6–33)
MCHC RBC AUTO-ENTMCNC: 33.9 G/DL (ref 31.5–35.7)
MCV RBC AUTO: 86.1 FL (ref 79–97)
METHADONE UR QL SCN: NEGATIVE
MONOCYTES # BLD AUTO: 0.64 10*3/MM3 (ref 0.1–0.9)
MONOCYTES NFR BLD AUTO: 7.4 % (ref 5–12)
NEUTROPHILS NFR BLD AUTO: 5.93 10*3/MM3 (ref 1.7–7)
NEUTROPHILS NFR BLD AUTO: 69 % (ref 42.7–76)
NITRITE UR QL STRIP: NEGATIVE
NRBC BLD AUTO-RTO: 0 /100 WBC (ref 0–0.2)
OPIATES UR QL: NEGATIVE
OXYCODONE UR QL SCN: NEGATIVE
PCP UR QL SCN: NEGATIVE
PH UR STRIP.AUTO: 7 [PH] (ref 5–8)
PLATELET # BLD AUTO: 313 10*3/MM3 (ref 140–450)
PMV BLD AUTO: 9.3 FL (ref 6–12)
POTASSIUM SERPL-SCNC: 3.4 MMOL/L (ref 3.5–5.2)
PROPOXYPH UR QL: NEGATIVE
PROT SERPL-MCNC: 7.7 G/DL (ref 6–8.5)
PROT UR QL STRIP: NEGATIVE
RBC # BLD AUTO: 6.13 10*6/MM3 (ref 4.14–5.8)
SALICYLATES SERPL-MCNC: <0.3 MG/DL
SODIUM SERPL-SCNC: 140 MMOL/L (ref 136–145)
SP GR UR STRIP: <=1.005 (ref 1–1.03)
TRICYCLICS UR QL SCN: POSITIVE
UROBILINOGEN UR QL STRIP: NORMAL
WBC # BLD AUTO: 8.6 10*3/MM3 (ref 3.4–10.8)

## 2021-07-20 PROCEDURE — 80179 DRUG ASSAY SALICYLATE: CPT | Performed by: EMERGENCY MEDICINE

## 2021-07-20 PROCEDURE — 99284 EMERGENCY DEPT VISIT MOD MDM: CPT

## 2021-07-20 PROCEDURE — 85025 COMPLETE CBC W/AUTO DIFF WBC: CPT | Performed by: EMERGENCY MEDICINE

## 2021-07-20 PROCEDURE — 96374 THER/PROPH/DIAG INJ IV PUSH: CPT

## 2021-07-20 PROCEDURE — 25010000002 DIPHENHYDRAMINE PER 50 MG: Performed by: NURSE PRACTITIONER

## 2021-07-20 PROCEDURE — 80306 DRUG TEST PRSMV INSTRMNT: CPT | Performed by: EMERGENCY MEDICINE

## 2021-07-20 PROCEDURE — 93005 ELECTROCARDIOGRAM TRACING: CPT | Performed by: EMERGENCY MEDICINE

## 2021-07-20 PROCEDURE — 80053 COMPREHEN METABOLIC PANEL: CPT | Performed by: EMERGENCY MEDICINE

## 2021-07-20 PROCEDURE — 81003 URINALYSIS AUTO W/O SCOPE: CPT | Performed by: EMERGENCY MEDICINE

## 2021-07-20 PROCEDURE — 80143 DRUG ASSAY ACETAMINOPHEN: CPT | Performed by: EMERGENCY MEDICINE

## 2021-07-20 PROCEDURE — 96372 THER/PROPH/DIAG INJ SC/IM: CPT

## 2021-07-20 PROCEDURE — 25010000002 HALOPERIDOL LACTATE PER 5 MG: Performed by: NURSE PRACTITIONER

## 2021-07-20 PROCEDURE — 96375 TX/PRO/DX INJ NEW DRUG ADDON: CPT

## 2021-07-20 PROCEDURE — 82077 ASSAY SPEC XCP UR&BREATH IA: CPT | Performed by: EMERGENCY MEDICINE

## 2021-07-20 PROCEDURE — 25010000002 LORAZEPAM PER 2 MG: Performed by: NURSE PRACTITIONER

## 2021-07-20 RX ORDER — HALOPERIDOL 5 MG/ML
5 INJECTION INTRAMUSCULAR ONCE
Status: COMPLETED | OUTPATIENT
Start: 2021-07-20 | End: 2021-07-20

## 2021-07-20 RX ORDER — SODIUM CHLORIDE 0.9 % (FLUSH) 0.9 %
10 SYRINGE (ML) INJECTION AS NEEDED
Status: DISCONTINUED | OUTPATIENT
Start: 2021-07-20 | End: 2021-07-20 | Stop reason: HOSPADM

## 2021-07-20 RX ORDER — DIPHENHYDRAMINE HYDROCHLORIDE 50 MG/ML
25 INJECTION INTRAMUSCULAR; INTRAVENOUS ONCE
Status: COMPLETED | OUTPATIENT
Start: 2021-07-20 | End: 2021-07-20

## 2021-07-20 RX ORDER — LORAZEPAM 2 MG/ML
1 INJECTION INTRAMUSCULAR ONCE
Status: COMPLETED | OUTPATIENT
Start: 2021-07-20 | End: 2021-07-20

## 2021-07-20 RX ADMIN — HALOPERIDOL LACTATE 5 MG: 5 INJECTION, SOLUTION INTRAMUSCULAR at 17:08

## 2021-07-20 RX ADMIN — DIPHENHYDRAMINE HYDROCHLORIDE 25 MG: 50 INJECTION, SOLUTION INTRAMUSCULAR; INTRAVENOUS at 18:49

## 2021-07-20 RX ADMIN — LORAZEPAM 1 MG: 2 INJECTION INTRAMUSCULAR; INTRAVENOUS at 19:49

## 2021-07-20 NOTE — ED NOTES
Behavioral health called at this time to come do an assessment on pt.      Kelly Brown  07/20/21 6811

## 2021-07-20 NOTE — ED PROVIDER NOTES
"Subjective   History of Present Illness  Is a 31-year-old male who comes in today complaining of severe anxiety and \"feel like he is going crazy\".  He reports that he was seeing his primary care provider who was given him 6 mg daily of Xanax.  He had CBD in his urine and cut him off completely without any type or dose.  He started to have withdrawals and went into a psychiatric facility.  Since he has been home he has been having increasing anxiety and panic attack.  He reports that he feels like he is going to go crazy and cannot make it anymore.  He would like to go back into the hospital to have his medications adjustment he feels more depressed as well.  Review of Systems   Constitutional: Negative.    HENT: Negative.    Eyes: Negative.    Respiratory: Negative.    Cardiovascular: Negative.    Gastrointestinal: Negative.    Genitourinary: Negative.    Skin: Negative.    Neurological: Negative.    Psychiatric/Behavioral: Positive for agitation. The patient is nervous/anxious.        Past Medical History:   Diagnosis Date   • Anxiety    • Anxiety    • Anxiety and depression    • Hypertension    • Migraine    • Panic disorder    • Self-injurious behavior     hx at 18 years old   • Tachycardia        No Known Allergies    Past Surgical History:   Procedure Laterality Date   • MOUTH SURGERY         Family History   Problem Relation Age of Onset   • Hyperlipidemia Father    • Diabetes Father    • Anxiety disorder Father    • Depression Father    • Diabetes Maternal Grandfather    • Diabetes Paternal Grandfather    • Autoimmune disease Mother        Social History     Socioeconomic History   • Marital status: Single     Spouse name: Not on file   • Number of children: Not on file   • Years of education: Not on file   • Highest education level: Not on file   Tobacco Use   • Smoking status: Light Tobacco Smoker     Years: 14.00     Types: Cigarettes   • Smokeless tobacco: Former User   • Tobacco comment: started with use " of tobacco products at 17 years   Vaping Use   • Vaping Use: Every day   • Substances: Nicotine, THC, Flavoring, quit using Delta 8 THC   • Devices: Refillable tank   Substance and Sexual Activity   • Alcohol use: No   • Drug use: Not Currently   • Sexual activity: Not Currently     Partners: Female           Objective   Physical Exam  Vitals and nursing note reviewed.   Constitutional:       Appearance: Normal appearance. He is normal weight.   Neurological:      Mental Status: He is alert.     GEN: No acute distress  Head: Normocephalic, atraumatic  Eyes: Pupils equal round reactive to light  ENT: Posterior pharynx normal in appearance, oral mucosa is moist  Chest: Nontender to palpation  Cardiovascular: Regular rate  Lungs: Clear to auscultation bilaterally  Abdomen: Soft, nontender, nondistended, no peritoneal signs  Extremities: No edema, normal appearance  Neuro: GCS 15  Psych: Mood and affect are anxious and tearful.       Procedures           ED Course  ED Course as of Jul 20 2036   Tue Jul 20, 2021   1843 Feeling more anxious after haldol. Will try benadryl.     [TW]   1852 EKG interpreted by me reveals sinus tachycardia rate 100.  No ectopy no ischemic change.    [PF]   1944 Symptoms have not subsided after benadryl will try ativan.     [TW]   1944 Patient reports he does not want to go in patient. He just requesting resources.     [TW]   2035 Feeling better after medication. I have advised him strict return to care instructions and he is agreeable to this plan of care.     [TW]      ED Course User Index  [PF] Jacky Frye W, DO  [TW] Romana Rush, APRN                                           MDM  Number of Diagnoses or Management Options     Amount and/or Complexity of Data Reviewed  Clinical lab tests: ordered and reviewed  Tests in the radiology section of CPT®: ordered and reviewed  Review and summarize past medical records: yes  Discuss the patient with other providers: yes  Independent  visualization of images, tracings, or specimens: yes    Risk of Complications, Morbidity, and/or Mortality  Presenting problems: moderate  Diagnostic procedures: moderate  Management options: moderate        Final diagnoses:   Anxiety       ED Disposition  ED Disposition     ED Disposition Condition Comment    Discharge Stable           Jacky Hair MD  [address] [place] [address] [phone]    Schedule an appointment as soon as possible for a visit            Medication List      No changes were made to your prescriptions during this visit.          Romana Rush, APRN  07/20/21 2034

## 2021-07-21 NOTE — CONSULTS
"Rafael Goncalves  1990    TIME: [id number]    Is patient agreeable to admission/treatment? No    Guardian: self    Pt Lives With: Brother in [place], [place]    Presenting Problems: Patient presents to ED with his mother for worsening anxiety, panic attacks, cold sweats, muscle jerking/clenching. Patient reports he was discharged from detox at [facility] 1 week ago and symptoms have not improved.     Current Stressors: \"Not right now.\"     Depression: 3     Anxiety: 8    Previous Psychiatric Treatment: Yes, patient reports he was admitted to [facility] when he was 18. Patient admitted to [facility] last week 7/8/21-7/13/21 for detox. Patient is engaged in outpatient therapy at [facility] (Martin Alicia). Patient receives med management from PCP.     Last inpatient admission: [facility] 7/8/21-7/13/21    Number of admissions: 2    Last outpatient visit: 7/15/21 at [facility]     Suicidal: Absent    Previous Attempts: no prior suicide attempts      [place]UM[place]IA-SUICIDE SEVERITY RATING SCALE  Psychiatric Inpatient Setting - Discharge Screener    Ask questions that are bold and underlined Discharge   Ask Questions 1 and 2 YES NO   Wish to be Dead:   Person endorses thoughts about a wish to be dead or not alive anymore, or wish to fall asleep and not wake up.  While you were here in the hospital, have you wished you were dead or wished you could go to sleep and not wake up?  X   Suicidal Thoughts:   General non-specific thoughts of wanting to end one's life/die by suicide, “I've thought about killing myself” without general thoughts of ways to kill oneself/associated methods, intent, or plan.   While you were here in the hospital, have you actually had thoughts about killing yourself?   X   If YES to 2, ask questions 3, 4, 5, and 6.  If NO to 2, go directly to question 6   3) Suicidal Thoughts with Method (without Specific Plan or Intent to Act):   Person endorses thoughts of suicide and has thought of a least one " method during the assessment period. This is different than a specific plan with time, place or method details worked out. “I thought about taking an overdose but I never made a specific plan as to when where or how I would actually do it….and I would never go through with it.”   Have you been thinking about how you might kill yourself?      4) Suicidal Intent (without Specific Plan):   Active suicidal thoughts of killing oneself and patient reports having some intent to act on such thoughts, as opposed to “I have the thoughts but I definitely will not do anything about them.”   Have you had these thoughts and had some intention of acting on them or do you have some intention of acting on them after you leave the hospital?      5) Suicide Intent with Specific Plan:   Thoughts of killing oneself with details of plan fully or partially worked out and person has some intent to carry it out.   Have you started to work out or worked out the details of how to kill yourself either for while you were here in the hospital or for after you leave the hospital? Do you intend to carry out this plan?        6) Suicide Behavior    While you were here in the hospital, have you done anything, started to do anything, or prepared to do anything to end your life?    Examples: Took pills, cut yourself, tried to hang yourself, took out pills but didn't swallow any because you changed your mind or someone took them from you, collected pills, secured a means of obtaining a gun, gave away valuables, wrote a will or suicide note, etc.  X       Family Hx of Mental Health/Substance Abuse: Depression, Anxiety, and bipolar disorder    Delusions: Patient presents with linear thought process.     Hallucinations: None and Not demonstrated today    Mood: anxious     Homicidal Ideations: Absent     Abuse History: Patient did not disclose.     Does this require reporting: N/A    Legal History / History of Violence: The patient has no significant  history of legal issues.     Sleep: Fair    Appetite: Poor    Current Medical Conditions: Anxiety    Current Psychiatric Medications:   Effexor  Seroquel  Metrop     History of Inappropriate Sexual Behavior: No    Hopelessness: no    Orientation: alert and oriented to person, place, and time     Substance Abuse: does not use    COWS: N/A    CIWA: N/A    Withdrawal Symptoms: N/A     History of DT's: No    History of Seizures: No    SUBSTANCE ABUSE HISTORY:      DRUG   PRESENT USE  Y/N   AGE @ 1ST USE    ROUTE   HOW MUCH   HOW OFTEN   HOW LONG AT THIS RATE   Date of last use/  Amount used   Nicotine            Alcohol            Marijuana            Benzos              Neurontin            Methadone            Opiates              Cocaine             Heroin            Meth            Suboxone              If in active addiction, do living arrangements affect recovery?: N/A      DATA:   This therapist received a call from [facility] staff JOE Johnson with orders from TANYA Avendano for a behavioral health consult.  The patient serves as his own guardian and is agreeable to speak with me.  Met with patient at bedside. Patient is not under 1:1 security monitoring during assessment.  Patient is a  31  year old, single, , male residing in [place], [place]. Patient currently lives with his brother but has been staying with his mom for one week.  Patient is unemployed. Patient presents to ED with his mother for worsening anxiety, panic attacks, cold sweats, muscle jerking/clenching. Patient reports he was discharged from detox at [facility] 1 week ago and symptoms have not improved. Patient states PCP was prescribing Xanax for 2 years but stopped due to patient testing positive for THC from CBD vape pen. Patient was treated in ED with Haldol which made symptoms worse. Patient denies current stressors.       ASSESSMENT:    Therapist completed CSSRS with patient for suicide risk assessment.  The results of patient’s CSSRS  suggest that patient is denying death wish, SI, intent and preparatory behaviors.  Patient holds attention and is Cooperative with assessment.  Patient’s appearance is clean and casually dressed, appropriate.  The patient displays Restless psychomotor behavior. The patient's affect appears mood-congruent. The patient is observed to have normal rate, tone and rhythm of speech.   Patient observed to have Fair eye contact. The patient's displays fair insight, with fair impulse control and fair judgement.     PLAN:    At this time, patient is not agreeable for inpatient treatment. Patient reports he has outpatient appointment scheduled at [facility] in August. Therapist provided patient with additional outpatient resources. Therapist updated [facility] ED staff who are agreeable to plan. Advised patient to return to ED if symptoms worsen, patient voiced understanding and is agreeable.     Christine Gibson [facility]

## 2021-07-23 ENCOUNTER — HOSPITAL ENCOUNTER (EMERGENCY)
Facility: HOSPITAL | Age: 31
Discharge: HOME | End: 2021-07-23
Attending: EMERGENCY MEDICINE | Admitting: EMERGENCY MEDICINE

## 2021-07-23 ENCOUNTER — OFFICE VISIT (OUTPATIENT)
Dept: PSYCHIATRY | Facility: CLINIC | Age: 31
End: 2021-07-23

## 2021-07-23 VITALS
DIASTOLIC BLOOD PRESSURE: 80 MMHG | SYSTOLIC BLOOD PRESSURE: 130 MMHG | BODY MASS INDEX: 30.31 KG/M2 | WEIGHT: 200 LBS | HEIGHT: 68 IN | HEART RATE: 92 BPM

## 2021-07-23 VITALS
SYSTOLIC BLOOD PRESSURE: 143 MMHG | HEART RATE: 98 BPM | DIASTOLIC BLOOD PRESSURE: 84 MMHG | OXYGEN SATURATION: 98 % | TEMPERATURE: 98.2 F | RESPIRATION RATE: 18 BRPM | HEIGHT: 68 IN | BODY MASS INDEX: 30.31 KG/M2 | WEIGHT: 200 LBS

## 2021-07-23 DIAGNOSIS — F33.2 SEVERE EPISODE OF RECURRENT MAJOR DEPRESSIVE DISORDER, WITHOUT PSYCHOTIC FEATURES (HCC): Primary | Chronic | ICD-10-CM

## 2021-07-23 DIAGNOSIS — F41.1 GENERALIZED ANXIETY DISORDER: Chronic | ICD-10-CM

## 2021-07-23 DIAGNOSIS — G47.00 INSOMNIA, UNSPECIFIED TYPE: Chronic | ICD-10-CM

## 2021-07-23 DIAGNOSIS — F41.9 ANXIETY: Primary | ICD-10-CM

## 2021-07-23 LAB
HOLD SPECIMEN: NORMAL
HOLD SPECIMEN: NORMAL
WHOLE BLOOD HOLD SPECIMEN: NORMAL

## 2021-07-23 PROCEDURE — 90792 PSYCH DIAG EVAL W/MED SRVCS: CPT | Performed by: NURSE PRACTITIONER

## 2021-07-23 PROCEDURE — 99283 EMERGENCY DEPT VISIT LOW MDM: CPT

## 2021-07-23 RX ORDER — SODIUM CHLORIDE 0.9 % (FLUSH) 0.9 %
10 SYRINGE (ML) INJECTION AS NEEDED
Status: DISCONTINUED | OUTPATIENT
Start: 2021-07-23 | End: 2021-07-23 | Stop reason: HOSPADM

## 2021-07-23 RX ORDER — QUETIAPINE 200 MG/1
200 TABLET, FILM COATED, EXTENDED RELEASE ORAL NIGHTLY
Qty: 7 TABLET | Refills: 0 | Status: SHIPPED | OUTPATIENT
Start: 2021-07-23 | End: 2021-08-20

## 2021-07-23 RX ORDER — ARIPIPRAZOLE 5 MG/1
5 TABLET ORAL DAILY
Qty: 30 TABLET | Refills: 2 | Status: SHIPPED | OUTPATIENT
Start: 2021-07-23 | End: 2021-08-20

## 2021-07-23 RX ORDER — QUETIAPINE 400 MG/1
400 TABLET, FILM COATED, EXTENDED RELEASE ORAL NIGHTLY
Qty: 7 TABLET | Refills: 0 | Status: SHIPPED | OUTPATIENT
Start: 2021-07-23 | End: 2021-08-20

## 2021-07-23 RX ORDER — TRAZODONE HYDROCHLORIDE 50 MG/1
50-100 TABLET ORAL NIGHTLY
Qty: 60 TABLET | Refills: 2 | Status: SHIPPED | OUTPATIENT
Start: 2021-07-23 | End: 2021-10-13

## 2021-07-23 RX ORDER — HYDROXYZINE HYDROCHLORIDE 25 MG/1
25-50 TABLET, FILM COATED ORAL 3 TIMES DAILY PRN
Qty: 180 TABLET | Refills: 1 | Status: SHIPPED | OUTPATIENT
Start: 2021-07-23 | End: 2021-08-20

## 2021-07-23 NOTE — ED PROVIDER NOTES
Subjective   History of Present Illness    Chief Complaint: Anxiety  History of Present Illness: 31-year-old male recently admitted for benzodiazepine detox, feeling of panic, sweat, muscle aches.  Ongoing issue for the past 3 days.  Was admitted 7/8, discharge 7/13.  Requesting speak with behavioral health  Onset: Ongoing issue worse with last 3 days  Duration: Persist  Exacerbating / Alleviating factors: Use of prescribed medications without relief  Associated symptoms: None      Nurses Notes reviewed and agree, including vitals, allergies, social history and prior medical history.     REVIEW OF SYSTEMS: All systems reviewed and not pertinent unless noted.    Positive for: Anxiety    Negative for: Suicidal homicidal ideation, overdose  Review of Systems    Past Medical History:   Diagnosis Date   • Anxiety    • Anxiety    • Anxiety and depression    • Hypertension    • Migraine    • Panic disorder    • Self-injurious behavior     hx at 18 years old   • Tachycardia        No Known Allergies    Past Surgical History:   Procedure Laterality Date   • MOUTH SURGERY         Family History   Problem Relation Age of Onset   • Hyperlipidemia Father    • Diabetes Father    • Anxiety disorder Father    • Depression Father    • Diabetes Maternal Grandfather    • Diabetes Paternal Grandfather    • Autoimmune disease Mother        Social History     Socioeconomic History   • Marital status: Single     Spouse name: Not on file   • Number of children: Not on file   • Years of education: Not on file   • Highest education level: Not on file   Tobacco Use   • Smoking status: Light Tobacco Smoker     Years: 14.00     Types: Cigarettes   • Smokeless tobacco: Former User   • Tobacco comment: started with use of tobacco products at 17 years   Vaping Use   • Vaping Use: Every day   • Substances: Nicotine, THC, Flavoring, quit using Delta 8 THC   • Devices: Refillable tank   Substance and Sexual Activity   • Alcohol use: No   • Drug use:  Not Currently   • Sexual activity: Not Currently     Partners: Female           Objective   Physical Exam    CONSTITUTIONAL: Well developed, mildly anxious nontoxic 31-year-old  male,  in no acute distress.  VITAL SIGNS: per nursing, reviewed and noted  SKIN: exposed skin with no rashes, ulcerations or petechiae.  EYES: perrla. EOMI.  ENT: Normal voice.  Patient maintained wearing a mask throughout patient encounter due to coronavirus pandemic  RESPIRATORY:  No increased work of breathing. No retractions.   CARDIOVASCULAR:  regular rate and rhythm, no murmurs.  Good Peripheral pulses. Good cap refill to extremities.   GI: Abdomen soft, nontender, normal bowel sounds. No hernia. No ascites.  MUSCULOSKELETAL:  No tenderness. Full ROM. Strength and tone grossly normal.  no spasms. no neck or back tenderness or spasm.   NEUROLOGIC: Alert, oriented x 3. No gross deficits. GCS 15.   PSYCH: appropriate affect.  : no bladder tenderness or distention, no CVA tenderness      Procedures     No attending physician procedures were performed on this patient.      ED Course  ED Course as of Jul 23 0445 Fri Jul 16, 2021   1643 Tricyclic Antidepressants Screen(!): Positive [PF]   1643 THC Screen, Urine(!): Positive [PF]      ED Course User Index  [PF] Jacky Frye W, DO                                           MDM  31-year-old male presented with anxiety, he is outside of any window for repeat detox.  Drug screen is positive only for THC and tricyclics, consulted with behavioral health to evaluate patient.  Patient provided with outpatient resources.  States he had phone visit today with counselor, advised continue home medications outpatient follow-up return precautions were discussed.  Final diagnoses:   Anxiety   Benzodiazepine withdrawal, uncomplicated (CMS/HCC)       ED Disposition  ED Disposition     ED Disposition Condition Comment    Discharge Stable           PATIENT CONNECTION - [facility]  [id number]  [phone]  Call   if you do not have a primary care provider, for follow up.    [facility]  [address] [address] [phone]             Medication List      No changes were made to your prescriptions during this visit.          Jacky Frye, DO  07/23/21 0445

## 2021-07-23 NOTE — ED PROVIDER NOTES
Subjective   This is a 31-year-old male who presents to the ER today with complaints of withdrawal symptoms from his benzodiazepines.  Patient has been seen multiple times including an admission for similar symptoms.  He completed an inpatient stay at [facility] and reports he is currently compliant with his medication regimen of Seroquel and Effexor.  Saw a new psychiatric provider today, Nelson Chiang, with behavioral health here at [facility] where he states he was given as needed hydroxyzine and a plan to taper him off of the Seroquel while starting Abilify.  He is describing feelings of panic, nausea, vomiting, diarrhea, tremors and subjective fever.  He attempted to take 25 mg of the hydroxyzine today without relief of symptoms.  He is here requesting inpatient admission for transition of medications.  No other symptoms or complaints today.      History provided by:  Patient   used: No        Review of Systems   Constitutional: Negative for chills and fever.   HENT: Negative for congestion.    Eyes: Negative for photophobia.   Respiratory: Negative for cough, chest tightness, shortness of breath and wheezing.    Cardiovascular: Negative for chest pain and palpitations.   Gastrointestinal: Positive for diarrhea, nausea and vomiting. Negative for abdominal pain and constipation.   Genitourinary: Negative for dysuria, frequency and urgency.   Musculoskeletal: Negative for back pain and neck pain.   Skin: Negative for rash.   Neurological: Positive for tremors. Negative for dizziness, syncope, speech difficulty, weakness, light-headedness and headaches.   Psychiatric/Behavioral: Negative for agitation, confusion, hallucinations and self-injury. The patient is nervous/anxious.    All other systems reviewed and are negative.      Past Medical History:   Diagnosis Date   • Anxiety    • Anxiety    • Anxiety and depression    • Hypertension    • Migraine    • Panic disorder    •  Self-injurious behavior     hx at 18 years old   • Tachycardia        Allergies   Allergen Reactions   • Haldol [Haloperidol] Anxiety   • Prozac [Fluoxetine] Anxiety     Panic attacks         Past Surgical History:   Procedure Laterality Date   • MOUTH SURGERY         Family History   Problem Relation Age of Onset   • Hyperlipidemia Father    • Diabetes Father    • Anxiety disorder Father    • Depression Father    • Diabetes Maternal Grandfather    • Diabetes Paternal Grandfather    • Autoimmune disease Mother    • ADD / ADHD Neg Hx    • Alcohol abuse Neg Hx    • Bipolar disorder Neg Hx    • Dementia Neg Hx    • Drug abuse Neg Hx    • OCD Neg Hx    • Paranoid behavior Neg Hx    • Schizophrenia Neg Hx    • Seizures Neg Hx    • Self-Injurious Behavior  Neg Hx    • Suicide Attempts Neg Hx        Social History     Socioeconomic History   • Marital status: Single     Spouse name: Not on file   • Number of children: Not on file   • Years of education: Not on file   • Highest education level: Not on file   Tobacco Use   • Smoking status: Light Tobacco Smoker     Years: 14.00     Types: Cigarettes   • Smokeless tobacco: Former User   • Tobacco comment: started with use of tobacco products at 17 years   Vaping Use   • Vaping Use: Every day   • Substances: Nicotine, quit using Delta 8 THC   • Devices: Pre-filled or refillable cartridge, Refillable tank   Substance and Sexual Activity   • Alcohol use: No   • Drug use: Not Currently   • Sexual activity: Not Currently     Partners: Female           Objective   Physical Exam  Vitals reviewed.   Constitutional:       General: He is not in acute distress.     Appearance: Normal appearance. He is not toxic-appearing.   HENT:      Head: Normocephalic and atraumatic.      Right Ear: External ear normal.      Left Ear: External ear normal.      Nose: Nose normal.      Mouth/Throat:      Mouth: Mucous membranes are moist.      Pharynx: Oropharynx is clear.   Eyes:      Extraocular  Movements: Extraocular movements intact.      Conjunctiva/sclera: Conjunctivae normal.      Pupils: Pupils are equal, round, and reactive to light.   Cardiovascular:      Rate and Rhythm: Normal rate and regular rhythm.      Pulses: Normal pulses.      Heart sounds: Normal heart sounds.   Pulmonary:      Effort: Pulmonary effort is normal.      Breath sounds: Normal breath sounds.   Abdominal:      General: Bowel sounds are normal. There is no distension.      Palpations: Abdomen is soft.      Tenderness: There is no abdominal tenderness.   Musculoskeletal:         General: No swelling or tenderness. Normal range of motion.      Cervical back: Normal range of motion and neck supple.   Skin:     General: Skin is warm and dry.      Capillary Refill: Capillary refill takes less than 2 seconds.   Neurological:      General: No focal deficit present.      Mental Status: He is alert and oriented to person, place, and time.      GCS: GCS eye subscore is 4. GCS verbal subscore is 5. GCS motor subscore is 6.      Cranial Nerves: Cranial nerves are intact. No cranial nerve deficit.      Sensory: Sensation is intact.      Coordination: Coordination is intact.      Gait: Gait is intact.      Comments: No tremor visualized well the patient is in my presence.  Nursing staff does not visualize a tremor either.   Psychiatric:         Mood and Affect: Mood is anxious.         Behavior: Behavior normal.         Thought Content: Thought content normal.         Judgment: Judgment normal.         Procedures           ED Course                                           MDM  Number of Diagnoses or Management Options  Anxiety  Diagnosis management comments: In summary, this is a 31-year-old male who is endorsing withdrawal symptoms from his benzodiazepines.  Review of records indicate patient completed a detox program a couple of weeks ago and he states he has been taking his Seroquel and Effexor.  Saw a mental health care provider today  who came up with an appropriate treatment plan for him.  He is nervous about this treatment plan.  Admits to taking 25 mg of Vistaril without alleviating symptoms.  Dedrick very anxious.  There is no vomiting, diarrhea or tremor observed in the ER.  Behavioral health is consulted as this patient is complaining of symptoms related to a mental health care treatment plan.  After behavioral health provider speaks and discuss his case with this patient he admits that he is just very anxious about his treatment plan, but is not feeling the symptoms he initially checked in with.  He understands he cannot be admitted to the hospital for transition of psychiatric medications as he is not acutely psychotic or expressing desire to harm himself or others.  Patient is accompanied by his mother.  She is agreeable to taking him home.  Discussed appropriate dosing of the hydroxyzine and how and when he can take it.  Discussed return precautions to ER, they verbalized understanding.  Encouraged to follow-up as directed and return to ER with any new concerning or worsening of symptoms.    Patient Progress  Patient progress: stable      Final diagnoses:   Anxiety       ED Disposition  ED Disposition     ED Disposition Condition Comment    Discharge Stable           No follow-up provider specified.       Medication List      No changes were made to your prescriptions during this visit.          Layla Whittington, APRN  07/24/21 0049
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
## 2021-07-24 NOTE — DISCHARGE INSTRUCTIONS
Continue your medications as they are prescribed.  You may take 25 to 50 mg of the hydroxyzine every 6-8 hours as needed for anxiety.  Recommend contacting [facility] office Monday morning to ensure close follow-up.  Return to ER with any new concerning or worsening of symptoms.

## 2021-07-24 NOTE — CONSULTS
Brief Note    Time: 1939 - 1949    Pt presented to the ED with complaints of withdrawal sxs. Pt has a hx of anxiety and panic disorder and has been changing medications from Xanax to Seroquel. Pt reported increased anxiety, stomach issues, [place], and tremors.     Spoke with JAKY Power and JOE Robins. Both denied withdrawal sxs and assured therapist Pt could not being experiencing this. Pt presented with some sweating and anxiety but no different than initial assessment completed by this therapist. Pt also admitted his panic and anxiety has been better today than other days this week.     Spoke with Pt at beside about sxs occurring. Explored fears leading to panic over the last few days. Discussed appointment Pt had today and change to medication. Reassured Pt he is not withdrawal he is going through medication changes as discussed by provider Mariia. Encouraged Pt to also used coping skills for anxiety and panic due to these possibly causing somatic sxs as well.     Pt was agreeable to tracking his sxs, increasing coping skills use, utilizing protective factors, and follow ing up with provider on Monday. Therapist will also e-mail [facility] in the office to provide update and information about Pt.     Karin Oropeza, RG  07/23/2021

## 2021-08-20 ENCOUNTER — OFFICE VISIT (OUTPATIENT)
Dept: PSYCHIATRY | Facility: CLINIC | Age: 31
End: 2021-08-20

## 2021-08-20 VITALS
WEIGHT: 212.6 LBS | DIASTOLIC BLOOD PRESSURE: 82 MMHG | HEIGHT: 68 IN | SYSTOLIC BLOOD PRESSURE: 130 MMHG | BODY MASS INDEX: 32.22 KG/M2

## 2021-08-20 DIAGNOSIS — F33.2 SEVERE EPISODE OF RECURRENT MAJOR DEPRESSIVE DISORDER, WITHOUT PSYCHOTIC FEATURES (HCC): Primary | Chronic | ICD-10-CM

## 2021-08-20 DIAGNOSIS — F41.1 GENERALIZED ANXIETY DISORDER: Chronic | ICD-10-CM

## 2021-08-20 DIAGNOSIS — G47.00 INSOMNIA, UNSPECIFIED TYPE: Chronic | ICD-10-CM

## 2021-08-20 PROCEDURE — 99214 OFFICE O/P EST MOD 30 MIN: CPT | Performed by: NURSE PRACTITIONER

## 2021-08-20 RX ORDER — OLANZAPINE 5 MG/1
5 TABLET, ORALLY DISINTEGRATING ORAL NIGHTLY
Qty: 30 TABLET | Refills: 2 | Status: SHIPPED | OUTPATIENT
Start: 2021-08-20 | End: 2021-10-13

## 2021-08-20 NOTE — PROGRESS NOTES
"Chief Complaint  Anxiety, Depression, and Sleeping Problem    Subjective          Rafael Goncalves presents to [facility] for case management of his anxiety, depression, sleeping difficulties.    History of Present Illness: Patient presents today for follow-up appointment after seen and seen for initial evaluation on 07/23/2021.  Patient was today \"I have been okay.  My anxiety is still kind of bad, but not as bad as it was when I was withdrawing last time I was here\".  Patient presented to the emergency department secondary to his anxiety and withdrawal issues the same evening as he was here in July.  He has not been to the emergency department again since that time.  Patient also reports he has also moved back into his own place, and is living alone right now.  Patient completed his Seroquel taper and the transition to Abilify, however says he did not like the Abilify.  Patient remembers taking it in the past, and is having the same adverse effects with his speech as the last time.  Patient reports it causes him to stutter, and stumble over his words.  Patient also reports he is not sleeping as well since coming off of the Seroquel.  He says he is still struggling with things, but does feel as though there has been some improvement.  Patient denies any SI/HI, A/V hallucinations.    Current Medications:   Current Outpatient Medications   Medication Sig Dispense Refill   • metoprolol succinate XL (TOPROL-XL) 50 MG 24 hr tablet Take 50 mg by mouth 2 (two) times a day. Indications: High Blood Pressure Disorder     • traZODone (DESYREL) 50 MG tablet Take 1-2 tablets by mouth Every Night. Do not start until completing 2 week Seroquel taper 60 tablet 2   • venlafaxine XR (EFFEXOR-XR) 75 MG 24 hr capsule Take 225 mg by mouth Every Night.     • OLANZapine zydis (zyPREXA) 5 MG disintegrating tablet Place 1 tablet on the tongue Every Night. 30 tablet 2     No current facility-administered " "medications for this visit.       Mental Status Exam:   Hygiene:   good  Cooperation:  Cooperative  Eye Contact:  Good  Psychomotor Behavior:  Restless  Affect:  Restricted  Mood: anxious and panicky  Speech:  Normal  Thought Process:  Goal directed  Thought Content:  Mood congruent  Suicidal:  None  Homicidal:  None  Hallucinations:  None  Delusion:  None  Memory:  Intact  Orientation:  Person, Place, Time and Situation  Reliability:  good  Insight:  Good  Judgement:  Good  Impulse Control:  Good  Physical/Medical Issues:  Yes Hypertension, migraines, tachycardia       Objective   Vital Signs:   /82   Ht 172.7 cm (68\")   Wt 96.4 kg (212 lb 9.6 oz)   BMI 32.33 kg/m²     Physical Exam  Neurological:      Mental Status: He is oriented to person, place, and time. Mental status is at baseline.      Coordination: Coordination is intact.      Gait: Gait is intact.   Psychiatric:         Behavior: Behavior is cooperative.         Thought Content: Thought content normal.         Cognition and Memory: Cognition and memory normal.         Judgment: Judgment normal.        Result Review :     The following data was reviewed by: TANYA Pickens on 08/20/2021:    Data reviewed: Previous note, medication history          Assessment and Plan    Problem List Items Addressed This Visit     None      Visit Diagnoses     Severe episode of recurrent major depressive disorder, without psychotic features (CMS/HCC)  (Chronic)   -  Primary    Relevant Medications    OLANZapine zydis (zyPREXA) 5 MG disintegrating tablet    Generalized anxiety disorder  (Chronic)       Relevant Medications    OLANZapine zydis (zyPREXA) 5 MG disintegrating tablet    Insomnia, unspecified type  (Chronic)             PHQ-9 Score:   PHQ-9 Total Score: 19    Depression Screening:  Patient screened positive for depression based on a PHQ-9 score of 19 on 8/20/2021. Follow-up recommendations include: Prescribed antidepressant medication treatment and " Suicide Risk Assessment performed.      Tobacco Cessation:  Rafael Goncalves  reports that he has been smoking cigarettes. He has smoked for the past 14.00 years. He has quit using smokeless tobacco.. I have educated him on the risk of diseases from using tobacco products such as cancer, COPD and heart disease.     I advised him to quit and he is not willing to quit.    I spent 3  minutes counseling the patient.      Impression/Plan:  -This is my first follow-up with the patient.  Patient presents today reports he is continuing to struggle with his anxiety, and issues have been compounded by the fact he is not sleeping as well as he was.  Patient reports he feels as though his mood has been stable, and possibly a little improved, but he does not like the Abilify secondary to the adverse effect it is causing on his speech.  Patient reports he has not been to the emergency department since the evening he was here for his initial evaluation in July, and is taking a positive from that.  He is also been able to move out of his mother's and back into his own place, something else that is a positive.  -Maintain Effexor 225 mg daily.  -Maintain trazodone 50 to 100 mg nightly.  -Discontinue Abilify 5 mg daily.  -Discontinue Atarax 25 to 50 mg 3 times daily as needed.  Patient reports he has not taken this in some time and reports no efficacy from it.  -Start Zyprexa ODT 5 mg nightly.  We discussed risks versus benefits, as well as potential adverse effects associated with adding this medication to patient's daily regimen. Patient is in agreement with this plan and was educated on the importance of compliance with all aspects of treatment and follow-up appointments. Patient is agreeable to call the office with any worsening of symptoms or onset of side effects.  Patient provided with printed information regarding this new medication.  -Schedule follow-up for 1 month or as needed.      MEDS ORDERED DURING VISIT:  New  Medications Ordered This Visit   Medications   • OLANZapine zydis (zyPREXA) 5 MG disintegrating tablet     Sig: Place 1 tablet on the tongue Every Night.     Dispense:  30 tablet     Refill:  2         Follow Up   Return in about 1 month (around 9/20/2021), or if symptoms worsen or fail to improve, for Next scheduled follow up.  Patient was given instructions and counseling regarding his condition or for health maintenance advice. Please see specific information pulled into the AVS if appropriate.       TREATMENT PLAN/GOALS: Continue supportive psychotherapy efforts and medications as indicated. Treatment and medication options discussed during today's visit. Patient acknowledged and verbally consented to continue with current treatment plan and was educated on the importance of compliance with treatment and follow-up appointments.    MEDICATION ISSUES:  Discussed medication options and treatment plan of prescribed medication as well as the risks, benefits, and side effects including potential falls, possible impaired driving and metabolic adversities among others. Patient is agreeable to call the office with any worsening of symptoms or onset of side effects. Patient is agreeable to call 911 or go to the nearest ER should he/she begin having SI/HI.          This document has been electronically signed by TANYA Pyle, PMHNP-BC  August 20, 2021 11:30 EDT      Part of this note may be an electronic transcription/translation of spoken language to printed text using the Dragon Dictation System.

## 2021-09-11 DIAGNOSIS — F41.1 GENERALIZED ANXIETY DISORDER: Chronic | ICD-10-CM

## 2021-09-13 RX ORDER — HYDROXYZINE HYDROCHLORIDE 25 MG/1
25-50 TABLET, FILM COATED ORAL 3 TIMES DAILY PRN
Qty: 180 TABLET | Refills: 1 | OUTPATIENT
Start: 2021-09-13

## 2021-10-13 ENCOUNTER — OFFICE VISIT (OUTPATIENT)
Dept: PSYCHIATRY | Facility: CLINIC | Age: 31
End: 2021-10-13

## 2021-10-13 VITALS
HEART RATE: 127 BPM | HEIGHT: 68 IN | WEIGHT: 240.5 LBS | DIASTOLIC BLOOD PRESSURE: 80 MMHG | BODY MASS INDEX: 36.45 KG/M2 | SYSTOLIC BLOOD PRESSURE: 134 MMHG

## 2021-10-13 DIAGNOSIS — G47.00 INSOMNIA, UNSPECIFIED TYPE: ICD-10-CM

## 2021-10-13 DIAGNOSIS — F41.1 GENERALIZED ANXIETY DISORDER: Chronic | ICD-10-CM

## 2021-10-13 DIAGNOSIS — F33.2 SEVERE EPISODE OF RECURRENT MAJOR DEPRESSIVE DISORDER, WITHOUT PSYCHOTIC FEATURES (HCC): Primary | Chronic | ICD-10-CM

## 2021-10-13 PROCEDURE — 99214 OFFICE O/P EST MOD 30 MIN: CPT | Performed by: NURSE PRACTITIONER

## 2021-10-13 RX ORDER — QUETIAPINE FUMARATE 50 MG/1
TABLET, EXTENDED RELEASE ORAL
Qty: 53 TABLET | Refills: 0 | Status: SHIPPED | OUTPATIENT
Start: 2021-10-13 | End: 2021-11-04

## 2021-10-13 RX ORDER — DIPHENOXYLATE HYDROCHLORIDE AND ATROPINE SULFATE 2.5; .025 MG/1; MG/1
TABLET ORAL
COMMUNITY
Start: 2021-07-16 | End: 2021-10-13

## 2021-10-13 RX ORDER — HYDROXYZINE PAMOATE 25 MG/1
CAPSULE ORAL
COMMUNITY
Start: 2021-07-16 | End: 2021-10-13

## 2021-10-13 RX ORDER — VENLAFAXINE HYDROCHLORIDE 75 MG/1
225 CAPSULE, EXTENDED RELEASE ORAL NIGHTLY
Qty: 90 CAPSULE | Refills: 2 | Status: SHIPPED | OUTPATIENT
Start: 2021-10-13 | End: 2021-12-13

## 2021-10-13 NOTE — PROGRESS NOTES
Chief Complaint  Anxiety, Depression, and Sleeping Problem    Subjective          Rafael Goncalves presents to [facility] for medication management of his anxiety, depression, sleeping difficulties.    History of Present Illness: Presents today for follow-up appointment after last being seen on 08/20/2021.  Patient reports since his last appointment, his mother and several family members were positive for COVID.  Patient reports he missed his last appointment because he had to cancel secondary to his own exposure.  Patient reports he thought for sure he was going to be positive, but never tested positive, and had very mild symptoms.  Patient reports he has been doing okay overall, but feels as though his anxiety is still very bad.  He reports however it is not nearly as bad as it was immediately following stopping his Xanax.  Patient is currently taking Effexor 225 mg daily, and Zyprexa ODT 5 mg nightly.  He is also prescribed trazodone, but reports he does not take it because he does not believe it has helped his sleep at all.  Patient reports he has been sleeping okay, but does occasionally have some difficulty with getting to sleep.  Patient has gained approximately 30 pounds since his last appointment.  Patient reports he has been eating significantly more, possibly due to the Zyprexa.  Patient denies any SI/HI, A/V hallucinations.    Current Medications:   Current Outpatient Medications   Medication Sig Dispense Refill   • metoprolol succinate XL (TOPROL-XL) 50 MG 24 hr tablet Take 50 mg by mouth 2 (two) times a day. Indications: High Blood Pressure Disorder     • venlafaxine XR (EFFEXOR-XR) 75 MG 24 hr capsule Take 3 capsules by mouth Every Night. Indications: Major Depressive Disorder 90 capsule 2   • QUEtiapine fumarate ER (SEROquel XR) 50 MG tablet sustained-release 24 hour tablet Take 1 tablet by mouth Every Night for 7 days, THEN 2 tablets Every Night for 23 days. 53  "tablet 0     No current facility-administered medications for this visit.       Mental Status Exam:   Hygiene:   good  Cooperation:  Cooperative  Eye Contact:  Good  Psychomotor Behavior:  Aggitated  Affect:  Appropriate  Mood: anxious  Speech:  Normal  Thought Process:  Goal directed  Thought Content:  Mood congruent  Suicidal:  None  Homicidal:  None  Hallucinations:  None  Delusion:  None  Memory:  Intact  Orientation:  Person, Place, Time and Situation  Reliability:  good  Insight:  Good  Judgement:  Good  Impulse Control:  Good  Physical/Medical Issues:  No        Objective   Vital Signs:   /80   Pulse (!) 127   Ht 172.7 cm (68\")   Wt 109 kg (240 lb 8 oz)   BMI 36.57 kg/m²     Physical Exam  Neurological:      Mental Status: He is oriented to person, place, and time. Mental status is at baseline.      Coordination: Coordination is intact.      Gait: Gait is intact.   Psychiatric:         Behavior: Behavior is cooperative.         Thought Content: Thought content normal.         Cognition and Memory: Cognition and memory normal.         Judgment: Judgment normal.        Result Review :     The following data was reviewed by: TANYA Pickens on 10/13/2021:    Data reviewed: Previous note, medication history          Assessment and Plan    Problem List Items Addressed This Visit     None      Visit Diagnoses     Severe episode of recurrent major depressive disorder, without psychotic features (HCC)  (Chronic)   -  Primary    Relevant Medications    QUEtiapine fumarate ER (SEROquel XR) 50 MG tablet sustained-release 24 hour tablet    venlafaxine XR (EFFEXOR-XR) 75 MG 24 hr capsule    Generalized anxiety disorder  (Chronic)       Relevant Medications    QUEtiapine fumarate ER (SEROquel XR) 50 MG tablet sustained-release 24 hour tablet    venlafaxine XR (EFFEXOR-XR) 75 MG 24 hr capsule    Insomnia, unspecified type        Relevant Medications    QUEtiapine fumarate ER (SEROquel XR) 50 MG tablet " sustained-release 24 hour tablet          PHQ-9 Score:   PHQ-9 Total Score: 18    Depression Screening:  Patient screened positive for depression based on a PHQ-9 score of 18 on 10/13/2021. Follow-up recommendations include: Prescribed antidepressant medication treatment and Suicide Risk Assessment performed.      Tobacco Cessation:  Patient is a former tobacco user. No tobacco cessation education necessary.      Impression/Plan:  -This follow-up appointment.  Patient presents today and reports he has continued to struggle some with mood, but primarily with his anxiety.  Patient reports when he does believe his anxiety has improved some over the last couple months, it is still a major issue.  Patient is not sure if his current medication regimen is working as well.  Patient reports that the trazodone has not helped him sleep, and the Zyprexa has not been as effective with his anxiety as the Seroquel was.  Patient reports he does believe the Effexor has been helping well with his mood.  Patient reports he would like to try going back to the Seroquel extended release, but does not want to be on as much as he was taking before.  Patient reports he has continued therapy, but reports he has missed his last couple appointments secondary to the COVID exposures in his house.  -Maintain Effexor 225 mg daily.  -Discontinue Zyprexa ODT 5 mg nightly.  -Discontinue trazodone 50 to 100 mg nightly.  -Start Seroquel XR 50 mg nightly x7 days, then increase to 100 mg nightly after.  -Encourage patient to maintain all upcoming therapy appointments.  -Schedule follow-up for 1 month or as needed.    MEDS ORDERED DURING VISIT:  New Medications Ordered This Visit   Medications   • QUEtiapine fumarate ER (SEROquel XR) 50 MG tablet sustained-release 24 hour tablet     Sig: Take 1 tablet by mouth Every Night for 7 days, THEN 2 tablets Every Night for 23 days.     Dispense:  53 tablet     Refill:  0   • venlafaxine XR (EFFEXOR-XR) 75 MG 24 hr  capsule     Sig: Take 3 capsules by mouth Every Night. Indications: Major Depressive Disorder     Dispense:  90 capsule     Refill:  2         Follow Up   Return in about 1 month (around 11/13/2021), or if symptoms worsen or fail to improve, for Next scheduled follow up.  Patient was given instructions and counseling regarding his condition or for health maintenance advice. Please see specific information pulled into the AVS if appropriate.       TREATMENT PLAN/GOALS: Continue supportive psychotherapy efforts and medications as indicated. Treatment and medication options discussed during today's visit. Patient acknowledged and verbally consented to continue with current treatment plan and was educated on the importance of compliance with treatment and follow-up appointments.    MEDICATION ISSUES:  Discussed medication options and treatment plan of prescribed medication as well as the risks, benefits, and side effects including potential falls, possible impaired driving and metabolic adversities among others. Patient is agreeable to call the office with any worsening of symptoms or onset of side effects. Patient is agreeable to call 911 or go to the nearest ER should he/she begin having SI/HI.          This document has been electronically signed by TANYA Pyle, PMHNP-BC  October 13, 2021 16:20 EDT      Part of this note may be an electronic transcription/translation of spoken language to printed text using the Dragon Dictation System.

## 2021-11-04 DIAGNOSIS — F33.2 SEVERE EPISODE OF RECURRENT MAJOR DEPRESSIVE DISORDER, WITHOUT PSYCHOTIC FEATURES (HCC): Chronic | ICD-10-CM

## 2021-11-04 DIAGNOSIS — F41.1 GENERALIZED ANXIETY DISORDER: Chronic | ICD-10-CM

## 2021-11-04 DIAGNOSIS — G47.00 INSOMNIA, UNSPECIFIED TYPE: ICD-10-CM

## 2021-11-04 RX ORDER — QUETIAPINE FUMARATE 50 MG/1
100 TABLET, EXTENDED RELEASE ORAL NIGHTLY
Qty: 30 TABLET | Refills: 0 | Status: SHIPPED | OUTPATIENT
Start: 2021-11-04 | End: 2021-11-11 | Stop reason: SDUPTHER

## 2021-11-04 NOTE — TELEPHONE ENCOUNTER
This was started at his last appt. I want to reevaluate it prior to refilling it long term. I will refill enough to get him to his appt next week. Thank you.

## 2021-11-11 ENCOUNTER — OFFICE VISIT (OUTPATIENT)
Dept: PSYCHIATRY | Facility: CLINIC | Age: 31
End: 2021-11-11

## 2021-11-11 VITALS
HEART RATE: 85 BPM | HEIGHT: 68 IN | SYSTOLIC BLOOD PRESSURE: 130 MMHG | DIASTOLIC BLOOD PRESSURE: 84 MMHG | WEIGHT: 226 LBS | BODY MASS INDEX: 34.25 KG/M2

## 2021-11-11 DIAGNOSIS — G47.00 INSOMNIA, UNSPECIFIED TYPE: Chronic | ICD-10-CM

## 2021-11-11 DIAGNOSIS — F33.2 SEVERE EPISODE OF RECURRENT MAJOR DEPRESSIVE DISORDER, WITHOUT PSYCHOTIC FEATURES (HCC): Primary | Chronic | ICD-10-CM

## 2021-11-11 DIAGNOSIS — F41.1 GENERALIZED ANXIETY DISORDER: Chronic | ICD-10-CM

## 2021-11-11 PROCEDURE — 99214 OFFICE O/P EST MOD 30 MIN: CPT | Performed by: NURSE PRACTITIONER

## 2021-11-11 RX ORDER — METOPROLOL TARTRATE 50 MG/1
50 TABLET, FILM COATED ORAL 2 TIMES DAILY
COMMUNITY
Start: 2021-11-08

## 2021-11-11 RX ORDER — QUETIAPINE FUMARATE 25 MG/1
25 TABLET, FILM COATED ORAL 2 TIMES DAILY PRN
Qty: 30 TABLET | Refills: 1 | OUTPATIENT
Start: 2021-11-11 | End: 2022-05-11

## 2021-11-11 RX ORDER — QUETIAPINE FUMARATE 50 MG/1
100 TABLET, EXTENDED RELEASE ORAL NIGHTLY
Qty: 60 TABLET | Refills: 2 | Status: SHIPPED | OUTPATIENT
Start: 2021-11-11 | End: 2022-02-25

## 2021-11-11 RX ORDER — LOPERAMIDE HYDROCHLORIDE 2 MG/1
CAPSULE ORAL
COMMUNITY
Start: 2021-11-01 | End: 2022-07-07

## 2021-11-11 NOTE — PROGRESS NOTES
Chief Complaint  Anxiety, Depression, and Sleeping Problem    Subjective          Rafael Goncalves presents to [facility] for medication management of his anxiety, depression, sleeping difficulties.    History of Present Illness: Patient presents today for follow-up appointment for last being seen on 10/13/2021.  Patient reports he had a recent death in his family, his uncle who is 63 years old.  He reports he had multiple medical comorbidities, but then had complications after catching COVID.  Patient reports he also had a recent GI bug, but says he is finally feeling better.  He had to present to the emergency department for fluids.  Patient reports he has been very anxious lately.  He says he can tell when his Seroquel wears off, which is generally between 12 and 1 PM the next day.  Patient reports the initiation of the Seroquel X are 100 mg has been very helpful.  However he endorses struggling between the hours of 12 and 1 PM to when he takes his next dose of Seroquel, which is generally between 5 and 6 PM.  He reports during those hours his anxiety can become almost overwhelming.  Patient denies any new or significant issues with appetite.  Patient denies any SI/HI, A/V hallucinations.    Current Medications:   Current Outpatient Medications   Medication Sig Dispense Refill   • loperamide (IMODIUM) 2 MG capsule      • metoprolol tartrate (LOPRESSOR) 50 MG tablet      • QUEtiapine fumarate ER (SEROquel XR) 50 MG tablet sustained-release 24 hour tablet Take 2 tablets by mouth Every Night. 60 tablet 2   • venlafaxine XR (EFFEXOR-XR) 75 MG 24 hr capsule Take 3 capsules by mouth Every Night. Indications: Major Depressive Disorder 90 capsule 2   • QUEtiapine (SEROquel) 25 MG tablet Take 1 tablet by mouth 2 (Two) Times a Day As Needed (Anxiety). 30 tablet 1     No current facility-administered medications for this visit.       Mental Status Exam:   Hygiene:   good  Cooperation:   "Cooperative  Eye Contact:  Good  Psychomotor Behavior:  Appropriate  Affect:  Full range and Appropriate  Mood: anxious  Speech:  Monotone  Thought Process:  Goal directed  Thought Content:  Mood congruent  Suicidal:  None  Homicidal:  None  Hallucinations:  None  Delusion:  None  Memory:  Intact  Orientation:  Person, Place, Time and Situation  Reliability:  good  Insight:  Good  Judgement:  Good  Impulse Control:  Good  Physical/Medical Issues:  Yes HTN       Objective   Vital Signs:   /84   Pulse 85   Ht 172.7 cm (68\")   Wt 103 kg (226 lb)   BMI 34.36 kg/m²     Physical Exam  Neurological:      Mental Status: He is oriented to person, place, and time. Mental status is at baseline.      Coordination: Coordination is intact.      Gait: Gait is intact.   Psychiatric:         Behavior: Behavior is cooperative.         Thought Content: Thought content normal.         Cognition and Memory: Cognition and memory normal.         Judgment: Judgment normal.        Result Review :     The following data was reviewed by: TANYA Pickens on 11/11/2021:    Data reviewed: Previous note, medication history          Assessment and Plan    Problem List Items Addressed This Visit     None      Visit Diagnoses     Severe episode of recurrent major depressive disorder, without psychotic features (HCC)  (Chronic)   -  Primary    Relevant Medications    QUEtiapine (SEROquel) 25 MG tablet    QUEtiapine fumarate ER (SEROquel XR) 50 MG tablet sustained-release 24 hour tablet    Generalized anxiety disorder  (Chronic)       Relevant Medications    QUEtiapine (SEROquel) 25 MG tablet    QUEtiapine fumarate ER (SEROquel XR) 50 MG tablet sustained-release 24 hour tablet    Insomnia, unspecified type  (Chronic)       Relevant Medications    QUEtiapine (SEROquel) 25 MG tablet    QUEtiapine fumarate ER (SEROquel XR) 50 MG tablet sustained-release 24 hour tablet          PHQ-9 Score:   PHQ-9 Total Score: 16    Depression " Screening:  Patient screened positive for depression based on a PHQ-9 score of 16 on 11/11/2021. Follow-up recommendations include: Prescribed antidepressant medication treatment and Suicide Risk Assessment performed.      Tobacco Cessation:  Patient is a former tobacco user. No tobacco cessation education necessary.      Impression/Plan:  -This is a follow-up appointment.  Patient presents today and reports he has continued to struggle with anxiety since his last appointment, however does endorse his current medication regimen is helping.  Patient has taken multiple psychiatric medications in the past, with very limited efficacy from them.  Patient has an extensive history of overuse of benzodiazepines.  His medication history, advised the patient it would be a good idea to perform a GeneSight test.  Patient reports he is open to doing this.  -GeneSight performed in office today.  -Maintain Effexor 225 mg daily.  -Maintain Seroquel  mg q. evening.  -Start Seroquel 25 mg twice daily as needed.  Advised patient he can utilize this for further assistance with his anxiety.  Patient reports Seroquel has helped him tremendously over the years with his anxiety.  -Schedule follow-up for 1 month or as needed.    MEDS ORDERED DURING VISIT:  New Medications Ordered This Visit   Medications   • QUEtiapine (SEROquel) 25 MG tablet     Sig: Take 1 tablet by mouth 2 (Two) Times a Day As Needed (Anxiety).     Dispense:  30 tablet     Refill:  1   • QUEtiapine fumarate ER (SEROquel XR) 50 MG tablet sustained-release 24 hour tablet     Sig: Take 2 tablets by mouth Every Night.     Dispense:  60 tablet     Refill:  2         Follow Up   Return in about 1 month (around 12/11/2021), or if symptoms worsen or fail to improve, for Next scheduled follow up.  Patient was given instructions and counseling regarding his condition or for health maintenance advice. Please see specific information pulled into the AVS if appropriate.        TREATMENT PLAN/GOALS: Continue supportive psychotherapy efforts and medications as indicated. Treatment and medication options discussed during today's visit. Patient acknowledged and verbally consented to continue with current treatment plan and was educated on the importance of compliance with treatment and follow-up appointments.    MEDICATION ISSUES:  Discussed medication options and treatment plan of prescribed medication as well as the risks, benefits, and side effects including potential falls, possible impaired driving and metabolic adversities among others. Patient is agreeable to call the office with any worsening of symptoms or onset of side effects. Patient is agreeable to call 911 or go to the nearest ER should he/she begin having SI/HI.          This document has been electronically signed by TANYA Pyle, PMHNP-BC  November 11, 2021 20:13 EST      Part of this note may be an electronic transcription/translation of spoken language to printed text using the Dragon Dictation System.

## 2021-12-13 ENCOUNTER — OFFICE VISIT (OUTPATIENT)
Dept: PSYCHIATRY | Facility: CLINIC | Age: 31
End: 2021-12-13

## 2021-12-13 VITALS
SYSTOLIC BLOOD PRESSURE: 134 MMHG | BODY MASS INDEX: 34.25 KG/M2 | DIASTOLIC BLOOD PRESSURE: 82 MMHG | HEIGHT: 68 IN | WEIGHT: 226 LBS

## 2021-12-13 DIAGNOSIS — F33.2 SEVERE EPISODE OF RECURRENT MAJOR DEPRESSIVE DISORDER, WITHOUT PSYCHOTIC FEATURES (HCC): Primary | Chronic | ICD-10-CM

## 2021-12-13 DIAGNOSIS — F41.1 GENERALIZED ANXIETY DISORDER: Chronic | ICD-10-CM

## 2021-12-13 DIAGNOSIS — G47.00 INSOMNIA, UNSPECIFIED TYPE: Chronic | ICD-10-CM

## 2021-12-13 PROCEDURE — 99214 OFFICE O/P EST MOD 30 MIN: CPT | Performed by: NURSE PRACTITIONER

## 2021-12-13 RX ORDER — VENLAFAXINE HYDROCHLORIDE 37.5 MG/1
37.5 CAPSULE, EXTENDED RELEASE ORAL DAILY
Qty: 14 CAPSULE | Refills: 0 | Status: SHIPPED | OUTPATIENT
Start: 2021-12-13

## 2021-12-13 RX ORDER — VORTIOXETINE 10 MG/1
10 TABLET, FILM COATED ORAL DAILY
Qty: 30 TABLET | Refills: 2 | OUTPATIENT
Start: 2021-12-13 | End: 2022-05-11

## 2021-12-13 NOTE — PROGRESS NOTES
"Chief Complaint  Anxiety, Depression, and Sleeping Problem    Subjective          Rafael Goncalves presents to [facility] for medication management of his anxiety, depression, sleeping difficulties.    History of Present Illness: Patient presents today for follow-up appointment for last being seen on 11/11/2021.  He reports today \"I have not been so great lately\".  He reports his mood has been down lately and says \"I kind of feel hopeless right now\".  Patient denies any thoughts of self-harm, and reports he is not a danger to himself or anyone else.  He reports he is just struggling with his mood and anxiety, and being afraid he is not going to find medication that will help him consistently.  Discussed patient's GeneSight results with him.  Based on his results, his current medication regimen is compatible with him, however he is also been on it for very long time.  Patient would like to try something other than the Effexor.  Patient does report the Seroquel has continued to help with sleep.  Patient denies any SI/HI, A/V hallucinations.    Current Medications:   Current Outpatient Medications   Medication Sig Dispense Refill   • metoprolol tartrate (LOPRESSOR) 50 MG tablet      • QUEtiapine (SEROquel) 25 MG tablet Take 1 tablet by mouth 2 (Two) Times a Day As Needed (Anxiety). 30 tablet 1   • QUEtiapine fumarate ER (SEROquel XR) 50 MG tablet sustained-release 24 hour tablet Take 2 tablets by mouth Every Night. 60 tablet 2   • loperamide (IMODIUM) 2 MG capsule      • venlafaxine XR (Effexor XR) 37.5 MG 24 hr capsule Take 1 capsule by mouth Daily. 14 capsule 0   • Vortioxetine HBr (Trintellix) 10 MG tablet Take 10 mg by mouth Daily. 30 tablet 2     No current facility-administered medications for this visit.       Mental Status Exam:   Hygiene:   good  Cooperation:  Cooperative  Eye Contact:  Fair  Psychomotor Behavior:  Appropriate  Affect:  Restricted  Mood: depressed and " "anxious  Speech:  Normal  Thought Process:  Goal directed  Thought Content:  Mood congruent  Suicidal:  None  Homicidal:  None  Hallucinations:  None  Delusion:  None  Memory:  Intact  Orientation:  Person, Place, Time and Situation  Reliability:  good  Insight:  Good  Judgement:  Good  Impulse Control:  Good  Physical/Medical Issues:  Yes HTN       Objective   Vital Signs:   /82   Ht 172.7 cm (68\")   Wt 103 kg (226 lb)   BMI 34.36 kg/m²     Physical Exam  Neurological:      Mental Status: He is oriented to person, place, and time. Mental status is at baseline.      Coordination: Coordination is intact.      Gait: Gait is intact.   Psychiatric:         Behavior: Behavior is cooperative.        Result Review :     The following data was reviewed by: TANYA Pickens on 12/13/2021:    Data reviewed: Previous note, medication history          Assessment and Plan    Problem List Items Addressed This Visit     None      Visit Diagnoses     Severe episode of recurrent major depressive disorder, without psychotic features (HCC)  (Chronic)   -  Primary    Relevant Medications    Vortioxetine HBr (Trintellix) 10 MG tablet    venlafaxine XR (Effexor XR) 37.5 MG 24 hr capsule    Generalized anxiety disorder  (Chronic)       Relevant Medications    Vortioxetine HBr (Trintellix) 10 MG tablet    venlafaxine XR (Effexor XR) 37.5 MG 24 hr capsule    Insomnia, unspecified type  (Chronic)             PHQ-9 Score:   PHQ-9 Total Score: 24    Depression Screening:  Patient screened positive for depression based on a PHQ-9 score of 24 on 12/13/2021. Follow-up recommendations include: Prescribed antidepressant medication treatment and Suicide Risk Assessment performed.      Tobacco Cessation:  Patient is a former tobacco user. No tobacco cessation education necessary.      Impression/Plan:  -This follow-up appointment.  Patient presents today reports he has been struggling with his mood and anxiety more so than in the past " over the last month.  He endorses frustration and fear over not finding a good medication regimen for management of his symptoms.  Discussed GeneSight results at length, and we will make the following changes.  -Discontinue Effexor via 4-week taper.  Patient will take 187.5 mg daily x7 days, then 150 mg daily x7 days, then 75 mg daily x7 days, then 37.5 mg daily x7 days and then stop.  -Start Trintellix 10 mg daily.  Patient will start Trintellix during week 3 of his Effexor taper.  -Maintain Seroquel  mg nightly, IR 25 mg twice daily as needed.  -Schedule follow-up for 6 weeks or as needed.    MEDS ORDERED DURING VISIT:  New Medications Ordered This Visit   Medications   • Vortioxetine HBr (Trintellix) 10 MG tablet     Sig: Take 10 mg by mouth Daily.     Dispense:  30 tablet     Refill:  2   • venlafaxine XR (Effexor XR) 37.5 MG 24 hr capsule     Sig: Take 1 capsule by mouth Daily.     Dispense:  14 capsule     Refill:  0         Follow Up   Return in about 6 weeks (around 1/24/2022), or if symptoms worsen or fail to improve, for Next scheduled follow up.  Patient was given instructions and counseling regarding his condition or for health maintenance advice. Please see specific information pulled into the AVS if appropriate.       TREATMENT PLAN/GOALS: Continue supportive psychotherapy efforts and medications as indicated. Treatment and medication options discussed during today's visit. Patient acknowledged and verbally consented to continue with current treatment plan and was educated on the importance of compliance with treatment and follow-up appointments.    MEDICATION ISSUES:  Discussed medication options and treatment plan of prescribed medication as well as the risks, benefits, and side effects including potential falls, possible impaired driving and metabolic adversities among others. Patient is agreeable to call the office with any worsening of symptoms or onset of side effects. Patient is agreeable  to call 911 or go to the nearest ER should he/she begin having SI/HI.          This document has been electronically signed by TANYA Pyle, PMHNP-BC  December 14, 2021 07:43 EST      Part of this note may be an electronic transcription/translation of spoken language to printed text using the Dragon Dictation System.

## 2022-01-06 ENCOUNTER — TELEPHONE (OUTPATIENT)
Dept: PSYCHIATRY | Facility: CLINIC | Age: 32
End: 2022-01-06

## 2022-01-06 NOTE — TELEPHONE ENCOUNTER
Pt called in left a message that he is tapering off effexor and he is having a lot more anxiety and depression a lot more than he usually has. He wants to know what he should do. Please Advise.

## 2022-01-07 NOTE — TELEPHONE ENCOUNTER
He is in the process of transitioning from Effexor XR to Trintellix. It is possible after reducing his Effexor his anxiety and depression could worsen, but he should have started the Trintellix this week. It will take time for it to build in his system. He should have 1 more week of tapering his Effexor. It will be important for him to utilize his coping skills and behavioral methods of managing his anxiety and depression during the next couple weeks, in addition to his Seroquel PRN. Thank you.

## 2022-02-25 DIAGNOSIS — F33.2 SEVERE EPISODE OF RECURRENT MAJOR DEPRESSIVE DISORDER, WITHOUT PSYCHOTIC FEATURES: Chronic | ICD-10-CM

## 2022-02-25 DIAGNOSIS — F41.1 GENERALIZED ANXIETY DISORDER: Chronic | ICD-10-CM

## 2022-02-25 DIAGNOSIS — G47.00 INSOMNIA, UNSPECIFIED TYPE: Chronic | ICD-10-CM

## 2022-02-25 RX ORDER — QUETIAPINE FUMARATE 50 MG/1
100 TABLET, EXTENDED RELEASE ORAL NIGHTLY
Qty: 60 TABLET | Refills: 2 | Status: SHIPPED | OUTPATIENT
Start: 2022-02-25

## 2022-07-07 ENCOUNTER — OFFICE VISIT (OUTPATIENT)
Dept: GASTROENTEROLOGY | Facility: CLINIC | Age: 32
End: 2022-07-07

## 2022-07-07 VITALS
HEIGHT: 68 IN | SYSTOLIC BLOOD PRESSURE: 142 MMHG | BODY MASS INDEX: 34.86 KG/M2 | WEIGHT: 230 LBS | DIASTOLIC BLOOD PRESSURE: 88 MMHG

## 2022-07-07 DIAGNOSIS — R10.11 RUQ ABDOMINAL PAIN: Primary | ICD-10-CM

## 2022-07-07 DIAGNOSIS — R68.81 EARLY SATIETY: ICD-10-CM

## 2022-07-07 DIAGNOSIS — R14.0 BLOATING: ICD-10-CM

## 2022-07-07 PROCEDURE — 99243 OFF/OP CNSLTJ NEW/EST LOW 30: CPT | Performed by: PHYSICIAN ASSISTANT

## 2022-07-07 RX ORDER — OMEPRAZOLE 20 MG/1
20 CAPSULE, DELAYED RELEASE ORAL DAILY
Qty: 30 CAPSULE | Refills: 1 | Status: SHIPPED | OUTPATIENT
Start: 2022-07-07 | End: 2022-08-29

## 2022-07-07 NOTE — PROGRESS NOTES
New Patient Consult      Date: 2022   Patient Name: Rafael Goncalves  MRN: [id number]  : 1990     Primary Care Provider: Jacky Hair MD  Referring Provider: Laxmi    Chief Complaint   Patient presents with   • Abdominal Pain     History of Present Illness: Rafael Goncalves is a 32 y.o. male who is here today as a consultation with Gastroenterology for evaluation of Abdominal Pain.    Since 2022, he has been experiencing abdominal pain in the RUQ which is described as sharp pain and numbness. It is worse with activity and movement but not with meals or BMs. No rectal bleeding. He has soft normal stools daily. No black stools. No recent weight change. Appetite is good. No nausea or vomiting. Has early satiety. Has bloating which he feels is worse on the right side.     He had labs with PCP but does not know those results. Has not had any abdominal imaging. Took anti-acids for this pain without relief. He denies heartburn or reflux. No dysphagia. No current NSAIDs. He drinks alcohol seldomly. He vapes nicotine and takes CBN.     Subjective      Past Medical History:   Diagnosis Date   • Anxiety    • Anxiety    • Anxiety and depression    • Hypertension    • Migraine    • Panic disorder    • Self-injurious behavior     hx at 18 years old   • Tachycardia      Past Surgical History:   Procedure Laterality Date   • MOUTH SURGERY       Family History   Problem Relation Age of Onset   • Hyperlipidemia Father    • Diabetes Father    • Anxiety disorder Father    • Depression Father    • Diabetes Maternal Grandfather    • Diabetes Paternal Grandfather    • Autoimmune disease Mother    • ADD / ADHD Neg Hx    • Alcohol abuse Neg Hx    • Bipolar disorder Neg Hx    • Dementia Neg Hx    • Drug abuse Neg Hx    • OCD Neg Hx    • Paranoid behavior Neg Hx    • Schizophrenia Neg Hx    • Seizures Neg Hx    • Self-Injurious Behavior  Neg Hx    • Suicide Attempts Neg Hx      Social History     Socioeconomic  History   • Marital status: Single   Tobacco Use   • Smoking status: Former Smoker     Years: 14.00     Types: Cigarettes   • Smokeless tobacco: Former User   • Tobacco comment: started with use of tobacco products at 17 years   Vaping Use   • Vaping Use: Every day   • Substances: Nicotine, quit using Delta 8 THC   • Devices: Pre-filled or refillable cartridge, Refillable tank   Substance and Sexual Activity   • Alcohol use: No   • Drug use: Not Currently   • Sexual activity: Not Currently     Partners: Female     Current Outpatient Medications:   •  amitriptyline (ELAVIL) 25 MG tablet, Take 25 mg by mouth 4 (Four) Times a Day., Disp: , Rfl:   •  metoprolol tartrate (LOPRESSOR) 50 MG tablet, , Disp: , Rfl:   •  QUEtiapine fumarate ER (SEROquel XR) 50 MG tablet sustained-release 24 hour tablet, TAKE 2 TABLETS BY MOUTH EVERY NIGHT, Disp: 60 tablet, Rfl: 2  •  venlafaxine XR (Effexor XR) 37.5 MG 24 hr capsule, Take 1 capsule by mouth Daily., Disp: 14 capsule, Rfl: 0    Allergies   Allergen Reactions   • Haldol [Haloperidol] Anxiety   • Prozac [Fluoxetine] Anxiety     Panic attacks       The following portions of the patient's history were reviewed and updated as appropriate: allergies, current medications, past family history, past medical history, past social history, past surgical history and problem list.    Objective     Physical Exam  Vitals reviewed.   Constitutional:       General: He is not in acute distress.     Appearance: Normal appearance. He is well-developed. He is not ill-appearing or diaphoretic.   HENT:      Head: Normocephalic and atraumatic.      Right Ear: External ear normal.      Left Ear: External ear normal.      Nose: Nose normal.      Mouth/Throat:      Comments: Wearing a mask  Eyes:      General: No scleral icterus.        Right eye: No discharge.         Left eye: No discharge.      Conjunctiva/sclera: Conjunctivae normal.   Neck:      Vascular: No JVD.   Cardiovascular:      Rate and  "Rhythm: Normal rate and regular rhythm.      Heart sounds: Normal heart sounds. No murmur heard.    No friction rub. No gallop.   Pulmonary:      Effort: Pulmonary effort is normal. No respiratory distress.      Breath sounds: Normal breath sounds. No wheezing or rales.   Chest:      Chest wall: No tenderness.   Abdominal:      General: Bowel sounds are normal. There is no distension.      Palpations: Abdomen is soft. There is no mass.      Tenderness: There is no abdominal tenderness. There is no guarding.   Musculoskeletal:         General: No deformity. Normal range of motion.      Cervical back: Normal range of motion.   Skin:     General: Skin is warm and dry.      Findings: No erythema or rash.   Neurological:      Mental Status: He is alert and oriented to person, place, and time.      Coordination: Coordination normal.   Psychiatric:         Behavior: Behavior normal.         Thought Content: Thought content normal.         Judgment: Judgment normal.      Comments: Anxious affect         Vitals:    07/07/22 1105   BP: 142/88   Weight: 104 kg (230 lb)   Height: 172.7 cm (68\")     Results Review:   No recent labs or abdominal imaging to review.     Assessment / Plan      1. RUQ abdominal pain  2. Early satiety  3. Bloating  Since Jan 2022, he has been experiencing abdominal pain in the RUQ which is described as sharp pain and numbness. It is worse with activity and movement but not with meals or BMs. Has early satiety and bloating which he feels is worse on the right side. Recent labs not available for review. No recent abdominal imaging. Does use marijuana product. Abdominal exam benign. Suspect underlying IBS.     Will request recent lab results from PCP to review  US abd to be arranged  Start daily PPI   Avoid dairy  Will consider endoscopic procedures at next visit if symptoms are persistent    - US Abdomen Complete; Future    - omeprazole (priLOSEC) 20 MG capsule; Take 1 capsule by mouth Daily. Take " before breakfast  Dispense: 30 capsule; Refill: 1          Follow Up:   Return in about 2 months (around 9/7/2022) for recheck abdominal pain, discussion of results.      Chari Potts PA-C  [facility] [place]  7/15/2022  14:54 EDT    Please note that portions of this note may have been completed with a voice recognition program. Efforts were made to edit the dictations, but occasionally words are mistranscribed.

## 2022-08-04 ENCOUNTER — HOSPITAL ENCOUNTER (OUTPATIENT)
Dept: ULTRASOUND IMAGING | Facility: HOSPITAL | Age: 32
Discharge: HOME | End: 2022-08-04
Admitting: PHYSICIAN ASSISTANT

## 2022-08-04 DIAGNOSIS — R68.81 EARLY SATIETY: ICD-10-CM

## 2022-08-04 DIAGNOSIS — R14.0 BLOATING: ICD-10-CM

## 2022-08-04 DIAGNOSIS — R10.11 RUQ ABDOMINAL PAIN: ICD-10-CM

## 2022-08-04 PROCEDURE — 76700 US EXAM ABDOM COMPLETE: CPT

## 2022-08-29 DIAGNOSIS — R10.11 RUQ ABDOMINAL PAIN: ICD-10-CM

## 2022-08-29 RX ORDER — OMEPRAZOLE 20 MG/1
20 CAPSULE, DELAYED RELEASE ORAL DAILY
Qty: 30 CAPSULE | Refills: 1 | Status: SHIPPED | OUTPATIENT
Start: 2022-08-29 | End: 2022-09-15

## 2022-09-15 ENCOUNTER — OFFICE VISIT (OUTPATIENT)
Dept: GASTROENTEROLOGY | Facility: CLINIC | Age: 32
End: 2022-09-15

## 2022-09-15 VITALS
HEIGHT: 68 IN | SYSTOLIC BLOOD PRESSURE: 124 MMHG | BODY MASS INDEX: 34.1 KG/M2 | TEMPERATURE: 97.6 F | WEIGHT: 225 LBS | DIASTOLIC BLOOD PRESSURE: 82 MMHG

## 2022-09-15 DIAGNOSIS — R10.11 RUQ ABDOMINAL PAIN: Primary | ICD-10-CM

## 2022-09-15 DIAGNOSIS — K76.0 NAFLD (NONALCOHOLIC FATTY LIVER DISEASE): ICD-10-CM

## 2022-09-15 DIAGNOSIS — R74.01 ELEVATED ALT MEASUREMENT: ICD-10-CM

## 2022-09-15 DIAGNOSIS — E66.9 CLASS 1 OBESITY WITH BODY MASS INDEX (BMI) OF 34.0 TO 34.9 IN ADULT, UNSPECIFIED OBESITY TYPE, UNSPECIFIED WHETHER SERIOUS COMORBIDITY PRESENT: ICD-10-CM

## 2022-09-15 PROCEDURE — 99214 OFFICE O/P EST MOD 30 MIN: CPT | Performed by: PHYSICIAN ASSISTANT

## 2022-09-15 NOTE — PROGRESS NOTES
Follow Up Note     Date: 09/15/2022   Patient Name: Rafael Goncalves  MRN: [id number]  : 1990     Primary Care Provider: Jacky Hair MD     Chief Complaint   Patient presents with   • Follow-up   • RUQ abdominal pain     History of present illness:   9/15/2022  Rafael Goncalves is a 32 y.o. male who is here today for follow up regarding RUQ abdominal pain.    Still with RUQ abdominal pain radiating around toward his mid back, same as previous. Bloating and early satiety have not been noticed since last visit. He has had some changes with his psych medications. He tried omeprazole daily but only took it for 1-2 weeks and discontinued it when he did not notice any changes in abdominal pain. He had US abd as directed and would like to discuss those results. No family history of liver disease. He is a nonalcoholic.      Interval History:  2022  Since 2022, he has been experiencing abdominal pain in the RUQ which is described as sharp pain and numbness. It is worse with activity and movement but not with meals or BMs. No rectal bleeding. He has soft normal stools daily. No black stools. No recent weight change. Appetite is good. No nausea or vomiting. Has early satiety. Has bloating which he feels is worse on the right side.      He had labs with PCP but does not know those results. Has not had any abdominal imaging. Took anti-acids for this pain without relief. He denies heartburn or reflux. No dysphagia. No current NSAIDs. He drinks alcohol seldomly. He vapes nicotine and takes CBN.     Subjective     Past Medical History:   Diagnosis Date   • Anxiety and depression    • Hypertension    • Migraine    • Panic disorder    • Self-injurious behavior     hx at 18 years old   • Tachycardia      Past Surgical History:   Procedure Laterality Date   • MOUTH SURGERY       Family History   Problem Relation Age of Onset   • Autoimmune disease Mother    • Hyperlipidemia Father    • Diabetes Father    •  Anxiety disorder Father    • Depression Father    • Diabetes Maternal Grandfather    • Diabetes Paternal Grandfather    • ADD / ADHD Neg Hx    • Alcohol abuse Neg Hx    • Bipolar disorder Neg Hx    • Dementia Neg Hx    • Drug abuse Neg Hx    • OCD Neg Hx    • Paranoid behavior Neg Hx    • Schizophrenia Neg Hx    • Seizures Neg Hx    • Self-Injurious Behavior  Neg Hx    • Suicide Attempts Neg Hx    • Colon cancer Neg Hx      Social History     Socioeconomic History   • Marital status: Single   Tobacco Use   • Smoking status: Former Smoker     Years: 14.00     Types: Cigarettes   • Smokeless tobacco: Former User   • Tobacco comment: started with use of tobacco products at 17 years   Vaping Use   • Vaping Use: Every day   • Substances: Nicotine, quit using Delta 8 THC   • Devices: Pre-filled or refillable cartridge, Refillable tank   Substance and Sexual Activity   • Alcohol use: No   • Drug use: Never   • Sexual activity: Not Currently     Partners: Female     Current Outpatient Medications:   •  amitriptyline (ELAVIL) 25 MG tablet, Take 25 mg by mouth 4 (Four) Times a Day., Disp: , Rfl:   •  metoprolol tartrate (LOPRESSOR) 50 MG tablet, Take 50 mg by mouth 2 (Two) Times a Day., Disp: , Rfl:   •  QUEtiapine fumarate ER (SEROquel XR) 50 MG tablet sustained-release 24 hour tablet, TAKE 2 TABLETS BY MOUTH EVERY NIGHT (Patient taking differently: Take 300 mg by mouth Every Night.), Disp: 60 tablet, Rfl: 2  •  venlafaxine XR (Effexor XR) 37.5 MG 24 hr capsule, Take 1 capsule by mouth Daily. (Patient taking differently: Take 75 mg by mouth Daily.), Disp: 14 capsule, Rfl: 0  •  omeprazole (priLOSEC) 20 MG capsule, TAKE 1 CAPSULE BY MOUTH DAILY. TAKE BEFORE BREAKFAST, Disp: 30 capsule, Rfl: 1    Allergies   Allergen Reactions   • Haldol [Haloperidol] Anxiety   • Prozac [Fluoxetine] Anxiety     Panic attacks       The following portions of the patient's history were reviewed and updated as appropriate: allergies, current  "medications, past family history, past medical history, past social history, past surgical history and problem list.    Objective     Physical Exam  Constitutional:       General: He is not in acute distress.     Appearance: Normal appearance. He is well-developed. He is not diaphoretic.   HENT:      Head: Normocephalic and atraumatic.      Right Ear: External ear normal.      Left Ear: External ear normal.      Nose: Nose normal.      Mouth/Throat:      Comments: Wearing a mask  Eyes:      General: No scleral icterus.        Right eye: No discharge.         Left eye: No discharge.      Conjunctiva/sclera: Conjunctivae normal.   Neck:      Trachea: No tracheal deviation.   Pulmonary:      Effort: Pulmonary effort is normal. No respiratory distress.   Abdominal:      General: Bowel sounds are normal. There is no distension.      Palpations: Abdomen is soft. There is no mass.      Tenderness: There is no abdominal tenderness.      Hernia: A hernia (possible small umbilical) is present.   Musculoskeletal:         General: Normal range of motion.      Cervical back: Normal range of motion.   Skin:     Coloration: Skin is not pale.      Findings: No erythema or rash.   Neurological:      Mental Status: He is alert and oriented to person, place, and time.      Coordination: Coordination normal.   Psychiatric:         Behavior: Behavior normal.         Thought Content: Thought content normal.         Judgment: Judgment normal.      Comments: Anxious affect       Vitals:    09/15/22 1111   BP: 124/82   Temp: 97.6 °F (36.4 °C)   Weight: 102 kg (225 lb)   Height: 172.7 cm (68\")       Results Review:   I reviewed the patient's new clinical results.    US Abdomen Complete  Result Date: 8/4/2022  Fatty infiltration of liver     Labs 5/2022: Hepatitis B surf antigen negative, hepatitis C antibody negative, lipase 38, alk phos 56, AST 29, ALT 46, Bili 0.3, Cr 1.10, glucose 96, Hgb 17.1, HCT 49.6, platelets 264,000    Assessment / " "Plan      1. RUQ abdominal pain  Since Jan 2022, he has been experiencing abdominal pain in the RUQ which is described as a sharp pain and sometimes a \"numbness.\" It is worse with activity and movement but not with meals or BMs. Had previously noticed early satiety and bloating (worse on the right side) but those have resolved now. He tried PPI daily but only briefly without help. Labs 5/2022 are unremarkable other than mildly elevated ALT. US abd 8/2022 showed fatty liver only. Does use marijuana product. Abdominal exam benign. Suspect underlying IBS vs musculoskeletal pain.       CTAP will be arranged  He has deferred any endoscopic procedure at this time  Will make recommendations on next step in care based on imaging result     - CT Abdomen Pelvis With Contrast; Future    2. NAFLD (nonalcoholic fatty liver disease)  3. Elevated ALT measurement  4. Class 1 obesity with body mass index (BMI) of 34.0 to 34.9 in adult, unspecified obesity type, unspecified whether serious comorbidity present  Fatty liver was found on recent US abd which is a new diagnosis for him. He is a nonalcoholic. BMI is 34. Labs 5/2022 showed only mildly elevated ALT, bili and alk phos normal. He should work on diet modifications for weight loss, mediterranean diet suggested, information given.           Follow Up:   He will be called with imaging results and follow up will be determined at that time.       Chari Potts PA-C  [facility]  9/15/2022  12:11 EDT    Dictated Utilizing Dragon Dictation: Part of this note may be an electronic transcription/translation of spoken language to printed text using the Dragon Dictation System.  "

## 2022-09-29 ENCOUNTER — HOSPITAL ENCOUNTER (OUTPATIENT)
Dept: CT IMAGING | Facility: HOSPITAL | Age: 32
Discharge: HOME | End: 2022-09-29
Admitting: PHYSICIAN ASSISTANT

## 2022-09-29 DIAGNOSIS — R10.11 RUQ ABDOMINAL PAIN: ICD-10-CM

## 2022-09-29 PROCEDURE — 74177 CT ABD & PELVIS W/CONTRAST: CPT

## 2022-09-29 PROCEDURE — 25010000002 IOPAMIDOL 61 % SOLUTION: Performed by: PHYSICIAN ASSISTANT

## 2022-09-29 RX ADMIN — IOPAMIDOL 100 ML: 612 INJECTION, SOLUTION INTRAVENOUS at 15:19

## 2022-10-24 DIAGNOSIS — R10.11 RUQ ABDOMINAL PAIN: ICD-10-CM

## 2022-10-24 RX ORDER — OMEPRAZOLE 20 MG/1
20 CAPSULE, DELAYED RELEASE ORAL DAILY
Qty: 30 CAPSULE | Refills: 1 | OUTPATIENT
Start: 2022-10-24

## 2023-02-24 ENCOUNTER — HOSPITAL ENCOUNTER (EMERGENCY)
Facility: HOSPITAL | Age: 33
Discharge: HOME | End: 2023-02-25
Attending: EMERGENCY MEDICINE | Admitting: EMERGENCY MEDICINE
Payer: COMMERCIAL

## 2023-02-24 DIAGNOSIS — R51.9 ACUTE NONINTRACTABLE HEADACHE, UNSPECIFIED HEADACHE TYPE: Primary | ICD-10-CM

## 2023-02-24 PROCEDURE — 99283 EMERGENCY DEPT VISIT LOW MDM: CPT

## 2023-02-24 RX ORDER — GABAPENTIN 300 MG/1
300 CAPSULE ORAL 3 TIMES DAILY
COMMUNITY

## 2023-02-24 RX ORDER — SUMATRIPTAN 50 MG/1
50 TABLET, FILM COATED ORAL
COMMUNITY

## 2023-02-24 RX ORDER — HYDROXYZINE PAMOATE 50 MG/1
50 CAPSULE ORAL 3 TIMES DAILY PRN
COMMUNITY

## 2023-02-25 ENCOUNTER — APPOINTMENT (OUTPATIENT)
Dept: CT IMAGING | Facility: HOSPITAL | Age: 33
End: 2023-02-25
Payer: COMMERCIAL

## 2023-02-25 VITALS
HEART RATE: 101 BPM | SYSTOLIC BLOOD PRESSURE: 135 MMHG | WEIGHT: 215 LBS | TEMPERATURE: 98.2 F | DIASTOLIC BLOOD PRESSURE: 87 MMHG | HEIGHT: 68 IN | OXYGEN SATURATION: 98 % | BODY MASS INDEX: 32.58 KG/M2 | RESPIRATION RATE: 16 BRPM

## 2023-02-25 LAB
ALBUMIN SERPL-MCNC: 4.4 G/DL (ref 3.5–5.2)
ALBUMIN/GLOB SERPL: 1.5 G/DL
ALP SERPL-CCNC: 68 U/L (ref 39–117)
ALT SERPL W P-5'-P-CCNC: 35 U/L (ref 1–41)
ANION GAP SERPL CALCULATED.3IONS-SCNC: 9.6 MMOL/L (ref 5–15)
AST SERPL-CCNC: 24 U/L (ref 1–40)
BASOPHILS # BLD AUTO: 0.04 10*3/MM3 (ref 0–0.2)
BASOPHILS NFR BLD AUTO: 0.4 % (ref 0–1.5)
BILIRUB SERPL-MCNC: 0.5 MG/DL (ref 0–1.2)
BUN SERPL-MCNC: 6 MG/DL (ref 6–20)
BUN/CREAT SERPL: 5.9 (ref 7–25)
CALCIUM SPEC-SCNC: 9 MG/DL (ref 8.6–10.5)
CHLORIDE SERPL-SCNC: 100 MMOL/L (ref 98–107)
CO2 SERPL-SCNC: 28.4 MMOL/L (ref 22–29)
CREAT SERPL-MCNC: 1.01 MG/DL (ref 0.76–1.27)
DEPRECATED RDW RBC AUTO: 40.4 FL (ref 37–54)
EGFRCR SERPLBLD CKD-EPI 2021: 101.3 ML/MIN/1.73
EOSINOPHIL # BLD AUTO: 0.06 10*3/MM3 (ref 0–0.4)
EOSINOPHIL NFR BLD AUTO: 0.6 % (ref 0.3–6.2)
ERYTHROCYTE [DISTWIDTH] IN BLOOD BY AUTOMATED COUNT: 13.2 % (ref 12.3–15.4)
GLOBULIN UR ELPH-MCNC: 3 GM/DL
GLUCOSE SERPL-MCNC: 111 MG/DL (ref 65–99)
HCT VFR BLD AUTO: 49.5 % (ref 37.5–51)
HGB BLD-MCNC: 16.9 G/DL (ref 13–17.7)
IMM GRANULOCYTES # BLD AUTO: 0.01 10*3/MM3 (ref 0–0.05)
IMM GRANULOCYTES NFR BLD AUTO: 0.1 % (ref 0–0.5)
LYMPHOCYTES # BLD AUTO: 1.97 10*3/MM3 (ref 0.7–3.1)
LYMPHOCYTES NFR BLD AUTO: 20.2 % (ref 19.6–45.3)
MCH RBC QN AUTO: 28.3 PG (ref 26.6–33)
MCHC RBC AUTO-ENTMCNC: 34.1 G/DL (ref 31.5–35.7)
MCV RBC AUTO: 82.9 FL (ref 79–97)
MONOCYTES # BLD AUTO: 0.46 10*3/MM3 (ref 0.1–0.9)
MONOCYTES NFR BLD AUTO: 4.7 % (ref 5–12)
NEUTROPHILS NFR BLD AUTO: 7.2 10*3/MM3 (ref 1.7–7)
NEUTROPHILS NFR BLD AUTO: 74 % (ref 42.7–76)
NRBC BLD AUTO-RTO: 0 /100 WBC (ref 0–0.2)
PLATELET # BLD AUTO: 276 10*3/MM3 (ref 140–450)
PMV BLD AUTO: 9.6 FL (ref 6–12)
POTASSIUM SERPL-SCNC: 3.8 MMOL/L (ref 3.5–5.2)
PROT SERPL-MCNC: 7.4 G/DL (ref 6–8.5)
RBC # BLD AUTO: 5.97 10*6/MM3 (ref 4.14–5.8)
SODIUM SERPL-SCNC: 138 MMOL/L (ref 136–145)
WBC NRBC COR # BLD: 9.74 10*3/MM3 (ref 3.4–10.8)

## 2023-02-25 PROCEDURE — 96374 THER/PROPH/DIAG INJ IV PUSH: CPT

## 2023-02-25 PROCEDURE — 85025 COMPLETE CBC W/AUTO DIFF WBC: CPT | Performed by: EMERGENCY MEDICINE

## 2023-02-25 PROCEDURE — 25010000002 DIPHENHYDRAMINE PER 50 MG: Performed by: EMERGENCY MEDICINE

## 2023-02-25 PROCEDURE — 80053 COMPREHEN METABOLIC PANEL: CPT | Performed by: EMERGENCY MEDICINE

## 2023-02-25 PROCEDURE — 25010000002 DEXAMETHASONE SODIUM PHOSPHATE 10 MG/ML SOLUTION: Performed by: EMERGENCY MEDICINE

## 2023-02-25 PROCEDURE — 25510000001 IOPAMIDOL 61 % SOLUTION: Performed by: EMERGENCY MEDICINE

## 2023-02-25 PROCEDURE — 70496 CT ANGIOGRAPHY HEAD: CPT

## 2023-02-25 PROCEDURE — 70450 CT HEAD/BRAIN W/O DYE: CPT

## 2023-02-25 PROCEDURE — 96375 TX/PRO/DX INJ NEW DRUG ADDON: CPT

## 2023-02-25 PROCEDURE — 25010000002 PROCHLORPERAZINE 10 MG/2ML SOLUTION: Performed by: EMERGENCY MEDICINE

## 2023-02-25 RX ORDER — DIPHENHYDRAMINE HYDROCHLORIDE 50 MG/ML
25 INJECTION INTRAMUSCULAR; INTRAVENOUS ONCE
Status: COMPLETED | OUTPATIENT
Start: 2023-02-25 | End: 2023-02-25

## 2023-02-25 RX ORDER — PROCHLORPERAZINE EDISYLATE 5 MG/ML
10 INJECTION INTRAMUSCULAR; INTRAVENOUS ONCE
Status: COMPLETED | OUTPATIENT
Start: 2023-02-25 | End: 2023-02-25

## 2023-02-25 RX ORDER — DEXAMETHASONE SODIUM PHOSPHATE 10 MG/ML
10 INJECTION, SOLUTION INTRAMUSCULAR; INTRAVENOUS ONCE
Status: COMPLETED | OUTPATIENT
Start: 2023-02-25 | End: 2023-02-25

## 2023-02-25 RX ORDER — BUTALBITAL, ACETAMINOPHEN AND CAFFEINE 50; 325; 40 MG/1; MG/1; MG/1
1 TABLET ORAL EVERY 6 HOURS PRN
Qty: 20 TABLET | Refills: 0 | Status: SHIPPED | OUTPATIENT
Start: 2023-02-25

## 2023-02-25 RX ADMIN — IOPAMIDOL 98 ML: 612 INJECTION, SOLUTION INTRAVENOUS at 01:14

## 2023-02-25 RX ADMIN — PROCHLORPERAZINE EDISYLATE 10 MG: 5 INJECTION INTRAMUSCULAR; INTRAVENOUS at 00:37

## 2023-02-25 RX ADMIN — DIPHENHYDRAMINE HYDROCHLORIDE 25 MG: 50 INJECTION, SOLUTION INTRAMUSCULAR; INTRAVENOUS at 00:37

## 2023-02-25 RX ADMIN — DEXAMETHASONE SODIUM PHOSPHATE 10 MG: 10 INJECTION INTRAMUSCULAR; INTRAVENOUS at 00:37

## 2023-02-25 RX ADMIN — SODIUM CHLORIDE 1000 ML: 9 INJECTION, SOLUTION INTRAVENOUS at 00:37

## 2025-02-04 ENCOUNTER — OFFICE VISIT (OUTPATIENT)
Dept: PULMONOLOGY | Facility: CLINIC | Age: 35
End: 2025-02-04
Payer: COMMERCIAL

## 2025-02-04 ENCOUNTER — PATIENT ROUNDING (BHMG ONLY) (OUTPATIENT)
Dept: PULMONOLOGY | Facility: CLINIC | Age: 35
End: 2025-02-04
Payer: COMMERCIAL

## 2025-02-04 VITALS
BODY MASS INDEX: 33.04 KG/M2 | SYSTOLIC BLOOD PRESSURE: 126 MMHG | RESPIRATION RATE: 14 BRPM | WEIGHT: 218 LBS | HEART RATE: 95 BPM | HEIGHT: 68 IN | OXYGEN SATURATION: 97 % | DIASTOLIC BLOOD PRESSURE: 82 MMHG

## 2025-02-04 DIAGNOSIS — G47.19 EXCESSIVE DAYTIME SLEEPINESS: Primary | ICD-10-CM

## 2025-02-04 DIAGNOSIS — Z72.0 VAPES NICOTINE CONTAINING SUBSTANCE: ICD-10-CM

## 2025-02-04 DIAGNOSIS — F51.5 NIGHTMARES: ICD-10-CM

## 2025-02-04 DIAGNOSIS — R06.83 SNORING: ICD-10-CM

## 2025-02-04 PROCEDURE — 3074F SYST BP LT 130 MM HG: CPT | Performed by: INTERNAL MEDICINE

## 2025-02-04 PROCEDURE — 3079F DIAST BP 80-89 MM HG: CPT | Performed by: INTERNAL MEDICINE

## 2025-02-04 PROCEDURE — 99244 OFF/OP CNSLTJ NEW/EST MOD 40: CPT | Performed by: INTERNAL MEDICINE

## 2025-02-04 RX ORDER — ATORVASTATIN CALCIUM 10 MG/1
10 TABLET, FILM COATED ORAL
COMMUNITY
Start: 2024-11-20

## 2025-02-04 RX ORDER — CHOLECALCIFEROL (VITAMIN D3) 25 MCG
1 TABLET ORAL DAILY
COMMUNITY
Start: 2025-01-06

## 2025-02-04 RX ORDER — DEXTROMETHORPHAN HYDROBROMIDE, BUPROPION HYDROCHLORIDE 105; 45 MG/1; MG/1
1 TABLET, MULTILAYER, EXTENDED RELEASE ORAL EVERY 12 HOURS SCHEDULED
COMMUNITY
Start: 2025-01-06

## 2025-02-04 NOTE — PROGRESS NOTES
CONSULT NOTE    Requested by:   Juju Goldman APRN Luttrell, Rebecca, APRN      Chief Complaint   Patient presents with    Sleeping Problem    Consult       Subjective:  Rafael Goncalves is a 35 y.o. male.   Patient came in today for evaluation of possible sleep apnea. Patient says that for the past few years he snores.    he has woken up in the middle of the night gasping for breath and sometimes with a choking sensation. Also, the patient's family notes that he has occasional pauses in the breathing.     he feels that he doesn't get restful night sleep and his quality has diminished considerably. he does feel sleepy watching TV and reading a book.      he is complaining of occasional headaches.     Patient mentions having occasional nights when he has nightmares     Patient's sleep schedule was reviewed. he goes to bed around 10 PM and wakes up in the morning around 6 AM.     There is known family history of sleep apnea, in his father    his Epsworth Sleepiness score was 0 /24.?    He does vape     Patient suffers from tachycardia.    he drinks 1-2 cups/cans of caffeinated drinks per day.      The following portions of the patient's history were reviewed and updated as appropriate: allergies, current medications, past family history, past medical history, past social history, past surgical history, and problem list.    Review of Systems   HENT:  Negative for sinus pressure, sneezing and sore throat.    Respiratory:  Negative for cough, chest tightness, shortness of breath and wheezing.    Cardiovascular:  Negative for palpitations and leg swelling.   Psychiatric/Behavioral:  Positive for sleep disturbance.    All other systems reviewed and are negative.      Past Medical History:   Diagnosis Date    Anxiety and depression     Hypertension     Migraine     Panic disorder     Self-injurious behavior     hx at 18 years old    Tachycardia        Social History     Tobacco Use    Smoking status: Former      "Types: Cigarettes    Smokeless tobacco: Former    Tobacco comments:     started with use of tobacco products at 17 years   Substance Use Topics    Alcohol use: No         Objective:  Visit Vitals  /82   Pulse 95   Resp 14   Ht 172.7 cm (68\") Comment: pt reported   Wt 98.9 kg (218 lb)   SpO2 97%   BMI 33.15 kg/m²       BMI Readings from Last 8 Encounters:   02/04/25 33.15 kg/m²   03/04/23 32.69 kg/m²   02/24/23 32.69 kg/m²   09/15/22 34.21 kg/m²   07/07/22 34.97 kg/m²   05/11/22 34.21 kg/m²   12/13/21 34.36 kg/m²   11/11/21 34.36 kg/m²       Physical Exam  Vitals reviewed.   Constitutional:       Appearance: He is well-developed.   HENT:      Head: Atraumatic.      Mouth/Throat:      Mouth: Mucous membranes are moist.      Comments: Oropharynx was crowded.  Eyes:      Pupils: Pupils are equal, round, and reactive to light.   Neck:      Thyroid: No thyromegaly.      Vascular: No JVD.      Trachea: No tracheal deviation.   Cardiovascular:      Rate and Rhythm: Normal rate and regular rhythm.   Pulmonary:      Effort: Pulmonary effort is normal. No respiratory distress.      Breath sounds: Normal breath sounds. No wheezing.   Musculoskeletal:      Right lower leg: No edema.      Left lower leg: No edema.      Comments: Gait was normal.   Skin:     General: Skin is warm and dry.   Neurological:      Mental Status: He is alert and oriented to person, place, and time.           Assessment/Plan:  Diagnoses and all orders for this visit:    1. Excessive daytime sleepiness (Primary)  -     Home Sleep Study; Future    2. Snoring  -     Home Sleep Study; Future    3. Nightmares  -     Home Sleep Study; Future    4. Vapes nicotine containing substance        Return in about 4 months (around 6/12/2025) for SleepONPIETRO/Kanika, ....Also 13 mths w/ Dr. Andrews.    DISCUSSION(if any):  Laboratory workup also showed   Lab Results   Component Value Date    HGB 16.9 02/25/2023    HGB 17.9 (H) 07/20/2021    HGB 17.6 07/08/2021   , "   Lab Results   Component Value Date    HCT 49.5 02/25/2023    HCT 52.8 (H) 07/20/2021    HCT 51.3 (H) 07/08/2021       Lab Results   Component Value Date    EOSABS 0.06 02/25/2023    EOSABS 0.04 07/20/2021    EOSABS 0.08 07/08/2021    & Laboratory workup also showed   Lab Results   Component Value Date    CO2 28.4 02/25/2023    CO2 26.9 07/20/2021    CO2 25.9 07/08/2021     ===========================  ===========================    Sleep questionnaire was reviewed with the patient    The pathophysiology of sleep apnea was discussed, with the patient.     We will encourage him to schedule the sleep study soon.     The patient was made aware of the limitation of the home sleep study, whereby it may underestimate the true AHI and also carries a low sensitivity.  I have informed him that even if the home sleep study is negative, we may suggest an in lab sleep study to completely and definitively rule out/in sleep apnea.  The patient has understood.  This was communicated to the patient, in case home study is to be requested.    The patient is agreeable to try CPAP/BiPAP, if needed.     Patient was educated on good sleep hygiene measures and voiced understanding of the same.     Patient was given reading material regarding sleep apnea    Patient was counseled regarding weight loss.       Dictated utilizing Dragon dictation.    This document was electronically signed by Burke Andrews MD on 02/04/25 at 14:20 EST

## 2025-02-05 ENCOUNTER — TELEPHONE (OUTPATIENT)
Dept: PULMONOLOGY | Facility: CLINIC | Age: 35
End: 2025-02-05

## 2025-02-05 NOTE — TELEPHONE ENCOUNTER
Caller: [facility]  SATYA CHANEL    Relationship: PCP    Best call back number: [phone]    What form or medical record are you requesting: LAST OFFICE     Who is requesting this form or medical record from you: [facility] PCP    How would you like to receive the form or medical records (pick-up, mail, fax): FAX   If fax, what is the fax number: [phone]    Timeframe paperwork needed: WHENEVER IS CONVENIENT

## 2025-02-24 ENCOUNTER — HOSPITAL ENCOUNTER (OUTPATIENT)
Dept: SLEEP MEDICINE | Facility: HOSPITAL | Age: 35
Discharge: HOME | End: 2025-02-24
Admitting: INTERNAL MEDICINE
Payer: COMMERCIAL

## 2025-02-24 DIAGNOSIS — R06.83 SNORING: ICD-10-CM

## 2025-02-24 DIAGNOSIS — G47.19 EXCESSIVE DAYTIME SLEEPINESS: ICD-10-CM

## 2025-02-24 DIAGNOSIS — F51.5 NIGHTMARES: ICD-10-CM

## 2025-02-24 PROCEDURE — G0399 HOME SLEEP TEST/TYPE 3 PORTA: HCPCS

## 2025-02-27 ENCOUNTER — TELEPHONE (OUTPATIENT)
Dept: PULMONOLOGY | Facility: CLINIC | Age: 35
End: 2025-02-27
Payer: COMMERCIAL